# Patient Record
Sex: FEMALE | Race: WHITE | NOT HISPANIC OR LATINO | Employment: FULL TIME | URBAN - METROPOLITAN AREA
[De-identification: names, ages, dates, MRNs, and addresses within clinical notes are randomized per-mention and may not be internally consistent; named-entity substitution may affect disease eponyms.]

---

## 2021-12-13 ENCOUNTER — TELEPHONE (OUTPATIENT)
Dept: CARDIAC SURGERY | Facility: CLINIC | Age: 51
End: 2021-12-13

## 2021-12-13 ENCOUNTER — HOSPITAL ENCOUNTER (INPATIENT)
Facility: HOSPITAL | Age: 51
LOS: 8 days | Discharge: NURSING, REHAB OR LONG-TERM CARE | End: 2021-12-21
Attending: THORACIC SURGERY (CARDIOTHORACIC VASCULAR SURGERY) | Admitting: THORACIC SURGERY (CARDIOTHORACIC VASCULAR SURGERY)

## 2021-12-13 DIAGNOSIS — I25.10 CAD, MULTIPLE VESSEL: Primary | ICD-10-CM

## 2021-12-13 DIAGNOSIS — Z95.1 S/P CABG X 4: ICD-10-CM

## 2021-12-13 LAB
APTT PPP: 30.8 SECONDS (ref 61–76.5)
BASOPHILS # BLD AUTO: 0.1 10*3/MM3 (ref 0–0.2)
BASOPHILS NFR BLD AUTO: 1.3 % (ref 0–1.5)
DEPRECATED RDW RBC AUTO: 38.9 FL (ref 37–54)
EOSINOPHIL # BLD AUTO: 0.3 10*3/MM3 (ref 0–0.4)
EOSINOPHIL NFR BLD AUTO: 2.9 % (ref 0.3–6.2)
ERYTHROCYTE [DISTWIDTH] IN BLOOD BY AUTOMATED COUNT: 13.2 % (ref 12.3–15.4)
GLUCOSE BLDC GLUCOMTR-MCNC: 157 MG/DL (ref 70–105)
HCT VFR BLD AUTO: 35.6 % (ref 34–46.6)
HGB BLD-MCNC: 12 G/DL (ref 12–15.9)
INR PPP: 0.97 (ref 0.93–1.1)
LYMPHOCYTES # BLD AUTO: 2.1 10*3/MM3 (ref 0.7–3.1)
LYMPHOCYTES NFR BLD AUTO: 20.9 % (ref 19.6–45.3)
MCH RBC QN AUTO: 28.2 PG (ref 26.6–33)
MCHC RBC AUTO-ENTMCNC: 33.8 G/DL (ref 31.5–35.7)
MCV RBC AUTO: 83.6 FL (ref 79–97)
MONOCYTES # BLD AUTO: 0.7 10*3/MM3 (ref 0.1–0.9)
MONOCYTES NFR BLD AUTO: 6.7 % (ref 5–12)
NEUTROPHILS NFR BLD AUTO: 6.8 10*3/MM3 (ref 1.7–7)
NEUTROPHILS NFR BLD AUTO: 68.2 % (ref 42.7–76)
NRBC BLD AUTO-RTO: 0.1 /100 WBC (ref 0–0.2)
PLATELET # BLD AUTO: 341 10*3/MM3 (ref 140–450)
PMV BLD AUTO: 6.8 FL (ref 6–12)
PROTHROMBIN TIME: 10.8 SECONDS (ref 9.6–11.7)
RBC # BLD AUTO: 4.26 10*6/MM3 (ref 3.77–5.28)
WBC NRBC COR # BLD: 9.9 10*3/MM3 (ref 3.4–10.8)

## 2021-12-13 PROCEDURE — 25010000002 HEPARIN (PORCINE) 25000-0.45 UT/250ML-% SOLUTION

## 2021-12-13 PROCEDURE — 85610 PROTHROMBIN TIME: CPT

## 2021-12-13 PROCEDURE — 93005 ELECTROCARDIOGRAM TRACING: CPT

## 2021-12-13 PROCEDURE — 85025 COMPLETE CBC W/AUTO DIFF WBC: CPT

## 2021-12-13 PROCEDURE — 93010 ELECTROCARDIOGRAM REPORT: CPT | Performed by: INTERNAL MEDICINE

## 2021-12-13 PROCEDURE — 85730 THROMBOPLASTIN TIME PARTIAL: CPT

## 2021-12-13 PROCEDURE — 82962 GLUCOSE BLOOD TEST: CPT

## 2021-12-13 RX ORDER — DEXTROSE MONOHYDRATE 25 G/50ML
25 INJECTION, SOLUTION INTRAVENOUS
Status: DISCONTINUED | OUTPATIENT
Start: 2021-12-13 | End: 2021-12-15

## 2021-12-13 RX ORDER — LISINOPRIL 10 MG/1
15 TABLET ORAL EVERY 12 HOURS
Status: ON HOLD | COMMUNITY
End: 2021-12-14

## 2021-12-13 RX ORDER — ASPIRIN 81 MG/1
81 TABLET, CHEWABLE ORAL DAILY
Status: DISCONTINUED | OUTPATIENT
Start: 2021-12-14 | End: 2021-12-14

## 2021-12-13 RX ORDER — NICOTINE POLACRILEX 4 MG
15 LOZENGE BUCCAL
Status: DISCONTINUED | OUTPATIENT
Start: 2021-12-13 | End: 2021-12-15

## 2021-12-13 RX ORDER — HEPARIN SODIUM 10000 [USP'U]/100ML
10.7 INJECTION, SOLUTION INTRAVENOUS
Status: DISCONTINUED | OUTPATIENT
Start: 2021-12-13 | End: 2021-12-14

## 2021-12-13 RX ORDER — INSULIN LISPRO 100 [IU]/ML
0-7 INJECTION, SOLUTION INTRAVENOUS; SUBCUTANEOUS AS NEEDED
Status: DISCONTINUED | OUTPATIENT
Start: 2021-12-13 | End: 2021-12-15

## 2021-12-13 RX ORDER — INSULIN LISPRO 100 [IU]/ML
0-7 INJECTION, SOLUTION INTRAVENOUS; SUBCUTANEOUS
Status: DISCONTINUED | OUTPATIENT
Start: 2021-12-14 | End: 2021-12-15

## 2021-12-13 RX ORDER — OLANZAPINE 10 MG/2ML
1 INJECTION, POWDER, LYOPHILIZED, FOR SOLUTION INTRAMUSCULAR
Status: DISCONTINUED | OUTPATIENT
Start: 2021-12-13 | End: 2021-12-15

## 2021-12-13 RX ADMIN — METOPROLOL TARTRATE 12.5 MG: 25 TABLET, FILM COATED ORAL at 23:10

## 2021-12-13 RX ADMIN — HEPARIN SODIUM 13.7 UNITS/KG/HR: 10000 INJECTION, SOLUTION INTRAVENOUS at 20:43

## 2021-12-13 NOTE — TELEPHONE ENCOUNTER
Spoke to Tihen with information regarding transfer from [facility]. To be admitted under Pagni for CAD. She is stating that it might be tomorrow before she can be transferred due to bed shortage.

## 2021-12-14 ENCOUNTER — ANESTHESIA EVENT (OUTPATIENT)
Dept: PERIOP | Facility: HOSPITAL | Age: 51
End: 2021-12-14

## 2021-12-14 ENCOUNTER — APPOINTMENT (OUTPATIENT)
Dept: CARDIOLOGY | Facility: HOSPITAL | Age: 51
End: 2021-12-14

## 2021-12-14 ENCOUNTER — ANESTHESIA (OUTPATIENT)
Dept: PERIOP | Facility: HOSPITAL | Age: 51
End: 2021-12-14

## 2021-12-14 ENCOUNTER — APPOINTMENT (OUTPATIENT)
Dept: RESPIRATORY THERAPY | Facility: HOSPITAL | Age: 51
End: 2021-12-14

## 2021-12-14 ENCOUNTER — APPOINTMENT (OUTPATIENT)
Dept: GENERAL RADIOLOGY | Facility: HOSPITAL | Age: 51
End: 2021-12-14

## 2021-12-14 LAB
ABO GROUP BLD: NORMAL
ACT BLD: 124 SECONDS (ref 89–137)
ACT BLD: 148 SECONDS (ref 89–137)
ACT BLD: 380 SECONDS (ref 89–137)
ACT BLD: 451 SECONDS (ref 89–137)
ACT BLD: 523 SECONDS (ref 89–137)
ALBUMIN SERPL-MCNC: 3.4 G/DL (ref 3.5–5.2)
ALBUMIN SERPL-MCNC: 4 G/DL (ref 3.5–5.2)
ALBUMIN SERPL-MCNC: 4.3 G/DL (ref 3.5–5.2)
ALP SERPL-CCNC: 93 U/L (ref 39–117)
ALT SERPL W P-5'-P-CCNC: 43 U/L (ref 1–33)
ANION GAP SERPL CALCULATED.3IONS-SCNC: 12 MMOL/L (ref 5–15)
ANION GAP SERPL CALCULATED.3IONS-SCNC: 13 MMOL/L (ref 5–15)
ANION GAP SERPL CALCULATED.3IONS-SCNC: 13 MMOL/L (ref 5–15)
APTT PPP: 26.8 SECONDS (ref 24–31)
APTT PPP: 43.2 SECONDS (ref 61–76.5)
APTT PPP: 46.5 SECONDS (ref 61–76.5)
ARTERIAL PATENCY WRIST A: ABNORMAL
ARTERIAL PATENCY WRIST A: POSITIVE
AST SERPL-CCNC: 39 U/L (ref 1–32)
ATMOSPHERIC PRESS: ABNORMAL MM[HG]
B-HCG UR QL: NEGATIVE
BASE DEFICIT: ABNORMAL
BASE EXCESS BLDA CALC-SCNC: -1.9 MMOL/L (ref 0–3)
BASE EXCESS BLDA CALC-SCNC: -2.3 MMOL/L (ref 0–3)
BASE EXCESS BLDA CALC-SCNC: -2.4 MMOL/L (ref 0–3)
BASE EXCESS BLDA CALC-SCNC: 1.9 MMOL/L (ref 0–3)
BASE EXCESS BLDA CALC-SCNC: 2 MMOL/L (ref 0–3)
BASOPHILS # BLD AUTO: 0.1 10*3/MM3 (ref 0–0.2)
BASOPHILS # BLD AUTO: 0.2 10*3/MM3 (ref 0–0.2)
BASOPHILS NFR BLD AUTO: 0.6 % (ref 0–1.5)
BASOPHILS NFR BLD AUTO: 1.2 % (ref 0–1.5)
BDY SITE: ABNORMAL
BH BB BLOOD EXPIRATION DATE: NORMAL
BH BB BLOOD EXPIRATION DATE: NORMAL
BH BB BLOOD TYPE BARCODE: 5100
BH BB BLOOD TYPE BARCODE: 5100
BH BB DISPENSE STATUS: NORMAL
BH BB DISPENSE STATUS: NORMAL
BH BB PRODUCT CODE: NORMAL
BH BB PRODUCT CODE: NORMAL
BH BB UNIT NUMBER: NORMAL
BH BB UNIT NUMBER: NORMAL
BH CV ECHO MEAS - ACS: 1.6 CM
BH CV ECHO MEAS - AO MAX PG (FULL): 15 MMHG
BH CV ECHO MEAS - AO MAX PG: 19.7 MMHG
BH CV ECHO MEAS - AO MEAN PG (FULL): 8.3 MMHG
BH CV ECHO MEAS - AO MEAN PG: 10.5 MMHG
BH CV ECHO MEAS - AO ROOT AREA (BSA CORRECTED): 1.5
BH CV ECHO MEAS - AO ROOT AREA: 6.6 CM^2
BH CV ECHO MEAS - AO ROOT DIAM: 2.9 CM
BH CV ECHO MEAS - AO V2 MAX: 222 CM/SEC
BH CV ECHO MEAS - AO V2 MEAN: 150.1 CM/SEC
BH CV ECHO MEAS - AO V2 VTI: 44.6 CM
BH CV ECHO MEAS - AORTIC HR: 68.8 BPM
BH CV ECHO MEAS - AORTIC R-R: 0.87 SEC
BH CV ECHO MEAS - ASC AORTA: 3.1 CM
BH CV ECHO MEAS - AVA(I,A): 1.7 CM^2
BH CV ECHO MEAS - AVA(I,D): 1.7 CM^2
BH CV ECHO MEAS - AVA(V,A): 1.7 CM^2
BH CV ECHO MEAS - AVA(V,D): 1.7 CM^2
BH CV ECHO MEAS - BSA(HAYCOCK): 2.1 M^2
BH CV ECHO MEAS - BSA: 2 M^2
BH CV ECHO MEAS - BZI_BMI: 32.9 KILOGRAMS/M^2
BH CV ECHO MEAS - BZI_METRIC_HEIGHT: 165.1 CM
BH CV ECHO MEAS - BZI_METRIC_WEIGHT: 89.8 KG
BH CV ECHO MEAS - CI(AO): 10.3 L/MIN/M^2
BH CV ECHO MEAS - CI(LVOT): 2.7 L/MIN/M^2
BH CV ECHO MEAS - CO(AO): 20.4 L/MIN
BH CV ECHO MEAS - CO(LVOT): 5.3 L/MIN
BH CV ECHO MEAS - EDV(CUBED): 83.9 ML
BH CV ECHO MEAS - EDV(MOD-SP4): 66.8 ML
BH CV ECHO MEAS - EDV(TEICH): 86.6 ML
BH CV ECHO MEAS - EF(CUBED): 71.9 %
BH CV ECHO MEAS - EF(MOD-BP): 59 %
BH CV ECHO MEAS - EF(MOD-SP4): 58.9 %
BH CV ECHO MEAS - EF(TEICH): 63.8 %
BH CV ECHO MEAS - ESV(CUBED): 23.6 ML
BH CV ECHO MEAS - ESV(MOD-SP4): 27.5 ML
BH CV ECHO MEAS - ESV(TEICH): 31.4 ML
BH CV ECHO MEAS - FS: 34.5 %
BH CV ECHO MEAS - IVS/LVPW: 1.1
BH CV ECHO MEAS - IVSD: 1.7 CM
BH CV ECHO MEAS - LA DIMENSION(2D): 3.7 CM
BH CV ECHO MEAS - LV DIASTOLIC VOL/BSA (35-75): 33.9 ML/M^2
BH CV ECHO MEAS - LV MASS(C)D: 287 GRAMS
BH CV ECHO MEAS - LV MASS(C)DI: 145.7 GRAMS/M^2
BH CV ECHO MEAS - LV MAX PG: 4.7 MMHG
BH CV ECHO MEAS - LV MEAN PG: 2.2 MMHG
BH CV ECHO MEAS - LV SYSTOLIC VOL/BSA (12-30): 14 ML/M^2
BH CV ECHO MEAS - LV V1 MAX: 108.4 CM/SEC
BH CV ECHO MEAS - LV V1 MEAN: 66.3 CM/SEC
BH CV ECHO MEAS - LV V1 VTI: 21.9 CM
BH CV ECHO MEAS - LVIDD: 4.4 CM
BH CV ECHO MEAS - LVIDS: 2.9 CM
BH CV ECHO MEAS - LVOT AREA: 3.5 CM^2
BH CV ECHO MEAS - LVOT DIAM: 2.1 CM
BH CV ECHO MEAS - LVPWD: 1.5 CM
BH CV ECHO MEAS - MV A MAX VEL: 69.7 CM/SEC
BH CV ECHO MEAS - MV DEC SLOPE: 358.7 CM/SEC^2
BH CV ECHO MEAS - MV DEC TIME: 0.22 SEC
BH CV ECHO MEAS - MV E MAX VEL: 80.6 CM/SEC
BH CV ECHO MEAS - MV E/A: 1.2
BH CV ECHO MEAS - MV MAX PG: 4.6 MMHG
BH CV ECHO MEAS - MV MEAN PG: 1.9 MMHG
BH CV ECHO MEAS - MV V2 MAX: 107.6 CM/SEC
BH CV ECHO MEAS - MV V2 MEAN: 64.8 CM/SEC
BH CV ECHO MEAS - MV V2 VTI: 23.9 CM
BH CV ECHO MEAS - MVA(VTI): 3.2 CM^2
BH CV ECHO MEAS - PA ACC TIME: 0.08 SEC
BH CV ECHO MEAS - PA MAX PG (FULL): 1.2 MMHG
BH CV ECHO MEAS - PA MAX PG: 4.8 MMHG
BH CV ECHO MEAS - PA MEAN PG (FULL): 1 MMHG
BH CV ECHO MEAS - PA MEAN PG: 2.9 MMHG
BH CV ECHO MEAS - PA PR(ACCEL): 44.7 MMHG
BH CV ECHO MEAS - PA V2 MAX: 109.6 CM/SEC
BH CV ECHO MEAS - PA V2 MEAN: 80.3 CM/SEC
BH CV ECHO MEAS - PA V2 VTI: 25.7 CM
BH CV ECHO MEAS - PVA(I,A): 2.3 CM^2
BH CV ECHO MEAS - PVA(I,D): 2.3 CM^2
BH CV ECHO MEAS - PVA(V,A): 2.2 CM^2
BH CV ECHO MEAS - PVA(V,D): 2.2 CM^2
BH CV ECHO MEAS - QP/QS: 0.78
BH CV ECHO MEAS - RAP SYSTOLE: 3 MMHG
BH CV ECHO MEAS - RV MAX PG: 3.6 MMHG
BH CV ECHO MEAS - RV MEAN PG: 1.8 MMHG
BH CV ECHO MEAS - RV V1 MAX: 95.4 CM/SEC
BH CV ECHO MEAS - RV V1 MEAN: 62.4 CM/SEC
BH CV ECHO MEAS - RV V1 VTI: 23.5 CM
BH CV ECHO MEAS - RVDD: 2.4 CM
BH CV ECHO MEAS - RVOT AREA: 2.5 CM^2
BH CV ECHO MEAS - RVOT DIAM: 1.8 CM
BH CV ECHO MEAS - RVSP: 15.1 MMHG
BH CV ECHO MEAS - SI(AO): 150.1 ML/M^2
BH CV ECHO MEAS - SI(CUBED): 30.6 ML/M^2
BH CV ECHO MEAS - SI(LVOT): 39 ML/M^2
BH CV ECHO MEAS - SI(MOD-SP4): 20 ML/M^2
BH CV ECHO MEAS - SI(TEICH): 28.1 ML/M^2
BH CV ECHO MEAS - SV(AO): 295.7 ML
BH CV ECHO MEAS - SV(CUBED): 60.3 ML
BH CV ECHO MEAS - SV(LVOT): 76.7 ML
BH CV ECHO MEAS - SV(MOD-SP4): 39.3 ML
BH CV ECHO MEAS - SV(RVOT): 59.5 ML
BH CV ECHO MEAS - SV(TEICH): 55.3 ML
BH CV ECHO MEAS - TR MAX VEL: 173.7 CM/SEC
BH CV XLRA MEAS - DIST GSV THIGH DIST LEFT: 0.61 CM
BH CV XLRA MEAS - DIST GSV THIGH DIST RIGHT: 0.49 CM
BH CV XLRA MEAS - GSV ANKLE DIST LEFT: 0.38 CM
BH CV XLRA MEAS - GSV ANKLE DIST RIGHT: 0.32 CM
BH CV XLRA MEAS - MID GSV CALF LEFT: 0.36 CM
BH CV XLRA MEAS - MID GSV CALF RIGHT: 0.33 CM
BH CV XLRA MEAS - MID GSV THIGH  LEFT: 0.49 CM
BH CV XLRA MEAS - MID GSV THIGH  RIGHT: 0.49 CM
BH CV XLRA MEAS - PROX GSV CALF DIST LEFT: 0.32 CM
BH CV XLRA MEAS - PROX GSV CALF DIST RIGHT: 0.43 CM
BH CV XLRA MEAS - PROX GSV THIGH  LEFT: 0.51 CM
BH CV XLRA MEAS - PROX GSV THIGH  RIGHT: 0.56 CM
BH CV XLRA MEAS LEFT CCA RATIO VEL: 119 CM/SEC
BH CV XLRA MEAS LEFT DIST CCA EDV: -28.6 CM/SEC
BH CV XLRA MEAS LEFT DIST CCA PSV: -110 CM/SEC
BH CV XLRA MEAS LEFT DIST ICA PSV: -27.4 CM/SEC
BH CV XLRA MEAS LEFT ICA RATIO VEL: -175 CM/SEC
BH CV XLRA MEAS LEFT ICA/CCA RATIO: -1.5
BH CV XLRA MEAS LEFT MID ICA EDV: -49.5 CM/SEC
BH CV XLRA MEAS LEFT MID ICA PSV: -175 CM/SEC
BH CV XLRA MEAS LEFT PROX CCA EDV: 24.8 CM/SEC
BH CV XLRA MEAS LEFT PROX CCA PSV: 119 CM/SEC
BH CV XLRA MEAS LEFT PROX ECA PSV: -174 CM/SEC
BH CV XLRA MEAS LEFT PROX ICA EDV: 47.5 CM/SEC
BH CV XLRA MEAS LEFT PROX ICA PSV: 159 CM/SEC
BH CV XLRA MEAS LEFT PROX SCLA PSV: 202 CM/SEC
BH CV XLRA MEAS LEFT VERTEBRAL A EDV: -19.8 CM/SEC
BH CV XLRA MEAS LEFT VERTEBRAL A PSV: -62 CM/SEC
BH CV XLRA MEAS RIGHT CCA RATIO VEL: 124 CM/SEC
BH CV XLRA MEAS RIGHT DIST CCA EDV: -20.5 CM/SEC
BH CV XLRA MEAS RIGHT DIST CCA PSV: -98.8 CM/SEC
BH CV XLRA MEAS RIGHT DIST ICA EDV: -22.5 CM/SEC
BH CV XLRA MEAS RIGHT DIST ICA PSV: -80.1 CM/SEC
BH CV XLRA MEAS RIGHT ICA RATIO VEL: -154 CM/SEC
BH CV XLRA MEAS RIGHT ICA/CCA RATIO: -1.2
BH CV XLRA MEAS RIGHT PROX CCA EDV: 29.2 CM/SEC
BH CV XLRA MEAS RIGHT PROX CCA PSV: 124 CM/SEC
BH CV XLRA MEAS RIGHT PROX ECA PSV: -209 CM/SEC
BH CV XLRA MEAS RIGHT PROX ICA EDV: -43.3 CM/SEC
BH CV XLRA MEAS RIGHT PROX ICA PSV: -154 CM/SEC
BH CV XLRA MEAS RIGHT PROX SCLA PSV: 200 CM/SEC
BH CV XLRA MEAS RIGHT VERTEBRAL A EDV: -17.4 CM/SEC
BH CV XLRA MEAS RIGHT VERTEBRAL A PSV: -68.3 CM/SEC
BILIRUB CONJ SERPL-MCNC: 0.2 MG/DL (ref 0–0.3)
BILIRUB INDIRECT SERPL-MCNC: 0.2 MG/DL
BILIRUB SERPL-MCNC: 0.4 MG/DL (ref 0–1.2)
BILIRUB UR QL STRIP: NEGATIVE
BLD GP AB SCN SERPL QL: NEGATIVE
BUN SERPL-MCNC: 17 MG/DL (ref 6–20)
BUN SERPL-MCNC: 18 MG/DL (ref 6–20)
BUN SERPL-MCNC: 20 MG/DL (ref 6–20)
BUN/CREAT SERPL: 24.3 (ref 7–25)
BUN/CREAT SERPL: 24.7 (ref 7–25)
BUN/CREAT SERPL: 34.5 (ref 7–25)
CA-I BLDA-SCNC: 1.2 MMOL/L (ref 1.15–1.33)
CA-I BLDA-SCNC: 1.34 MMOL/L (ref 1.12–1.32)
CA-I BLDA-SCNC: 1.35 MMOL/L (ref 1.15–1.33)
CA-I BLDA-SCNC: 1.35 MMOL/L (ref 1.15–1.33)
CA-I SERPL ISE-MCNC: 1.41 MMOL/L (ref 1.2–1.3)
CALCIUM SPEC-SCNC: 9 MG/DL (ref 8.6–10.5)
CALCIUM SPEC-SCNC: 9.7 MG/DL (ref 8.6–10.5)
CALCIUM SPEC-SCNC: 9.7 MG/DL (ref 8.6–10.5)
CHLORIDE SERPL-SCNC: 102 MMOL/L (ref 98–107)
CHLORIDE SERPL-SCNC: 107 MMOL/L (ref 98–107)
CHLORIDE SERPL-SCNC: 108 MMOL/L (ref 98–107)
CHOLEST SERPL-MCNC: 176 MG/DL (ref 0–200)
CLARITY UR: CLEAR
CLOSE TME COLL+ADP + EPINEP PNL BLD: 96 % (ref 86–100)
CO2 BLDA-SCNC: 23.8 MMOL/L (ref 22–29)
CO2 BLDA-SCNC: 23.8 MMOL/L (ref 22–29)
CO2 BLDA-SCNC: 24.7 MMOL/L (ref 22–29)
CO2 BLDA-SCNC: 27.8 MMOL/L (ref 22–29)
CO2 BLDA-SCNC: 29 MMOL/L (ref 23–27)
CO2 SERPL-SCNC: 22 MMOL/L (ref 22–29)
CO2 SERPL-SCNC: 22 MMOL/L (ref 22–29)
CO2 SERPL-SCNC: 24 MMOL/L (ref 22–29)
COLOR UR: YELLOW
CREAT SERPL-MCNC: 0.58 MG/DL (ref 0.57–1)
CREAT SERPL-MCNC: 0.7 MG/DL (ref 0.57–1)
CREAT SERPL-MCNC: 0.73 MG/DL (ref 0.57–1)
CROSSMATCH INTERPRETATION: NORMAL
CROSSMATCH INTERPRETATION: NORMAL
DEPRECATED RDW RBC AUTO: 38.5 FL (ref 37–54)
DEPRECATED RDW RBC AUTO: 40.3 FL (ref 37–54)
EOSINOPHIL # BLD AUTO: 0.1 10*3/MM3 (ref 0–0.4)
EOSINOPHIL # BLD AUTO: 0.4 10*3/MM3 (ref 0–0.4)
EOSINOPHIL NFR BLD AUTO: 1.2 % (ref 0.3–6.2)
EOSINOPHIL NFR BLD AUTO: 3.3 % (ref 0.3–6.2)
ERYTHROCYTE [DISTWIDTH] IN BLOOD BY AUTOMATED COUNT: 13 % (ref 12.3–15.4)
ERYTHROCYTE [DISTWIDTH] IN BLOOD BY AUTOMATED COUNT: 13.4 % (ref 12.3–15.4)
FIBRINOGEN PPP-MCNC: 369 MG/DL (ref 210–450)
GFR SERPL CREATININE-BSD FRML MDRD: 110 ML/MIN/1.73
GFR SERPL CREATININE-BSD FRML MDRD: 85 ML/MIN/1.73
GFR SERPL CREATININE-BSD FRML MDRD: 89 ML/MIN/1.73
GLUCOSE BLDC GLUCOMTR-MCNC: 126 MG/DL (ref 70–105)
GLUCOSE BLDC GLUCOMTR-MCNC: 130 MG/DL (ref 70–105)
GLUCOSE BLDC GLUCOMTR-MCNC: 137 MG/DL (ref 70–105)
GLUCOSE BLDC GLUCOMTR-MCNC: 137 MG/DL (ref 70–105)
GLUCOSE BLDC GLUCOMTR-MCNC: 141 MG/DL (ref 74–100)
GLUCOSE BLDC GLUCOMTR-MCNC: 143 MG/DL (ref 70–105)
GLUCOSE BLDC GLUCOMTR-MCNC: 146 MG/DL (ref 70–105)
GLUCOSE BLDC GLUCOMTR-MCNC: 147 MG/DL (ref 70–105)
GLUCOSE BLDC GLUCOMTR-MCNC: 149 MG/DL (ref 74–100)
GLUCOSE BLDC GLUCOMTR-MCNC: 149 MG/DL (ref 74–100)
GLUCOSE BLDC GLUCOMTR-MCNC: 160 MG/DL (ref 70–105)
GLUCOSE SERPL-MCNC: 129 MG/DL (ref 65–99)
GLUCOSE SERPL-MCNC: 132 MG/DL (ref 65–99)
GLUCOSE SERPL-MCNC: 144 MG/DL (ref 65–99)
GLUCOSE UR STRIP-MCNC: NEGATIVE MG/DL
HBA1C MFR BLD: 7.9 % (ref 3.5–5.6)
HCO3 BLDA-SCNC: 22.6 MMOL/L (ref 21–28)
HCO3 BLDA-SCNC: 22.6 MMOL/L (ref 21–28)
HCO3 BLDA-SCNC: 23.4 MMOL/L (ref 21–28)
HCO3 BLDA-SCNC: 26.5 MMOL/L (ref 21–28)
HCO3 BLDA-SCNC: 27.4 MMOL/L (ref 22–26)
HCT VFR BLD AUTO: 28.4 % (ref 34–46.6)
HCT VFR BLD AUTO: 37.3 % (ref 34–46.6)
HCT VFR BLDA CALC: 30 % (ref 38–51)
HCT VFR BLDA CALC: 30 % (ref 38–51)
HCT VFR BLDA CALC: 32 % (ref 38–51)
HCT VFR BLDA CALC: 33 % (ref 38–51)
HDLC SERPL-MCNC: 25 MG/DL (ref 40–60)
HEMODILUTION: NO
HEMODILUTION: YES
HGB BLD-MCNC: 12.4 G/DL (ref 12–15.9)
HGB BLD-MCNC: 9.5 G/DL (ref 12–15.9)
HGB BLDA-MCNC: 10.1 G/DL (ref 12–17)
HGB BLDA-MCNC: 10.2 G/DL (ref 12–17)
HGB BLDA-MCNC: 10.7 G/DL (ref 12–17)
HGB BLDA-MCNC: 11.2 G/DL (ref 12–17)
HGB UR QL STRIP.AUTO: NEGATIVE
INHALED O2 CONCENTRATION: 100 %
INHALED O2 CONCENTRATION: 21 %
INHALED O2 CONCENTRATION: 40 %
INHALED O2 CONCENTRATION: 70 %
INR PPP: 1 (ref 0.93–1.1)
INR PPP: 1.1 (ref 0.93–1.1)
KETONES UR QL STRIP: NEGATIVE
LDLC SERPL CALC-MCNC: 117 MG/DL (ref 0–100)
LDLC/HDLC SERPL: 4.54 {RATIO}
LEFT ARM BP: NORMAL MMHG
LEUKOCYTE ESTERASE UR QL STRIP.AUTO: NEGATIVE
LYMPHOCYTES # BLD AUTO: 1.2 10*3/MM3 (ref 0.7–3.1)
LYMPHOCYTES # BLD AUTO: 3.1 10*3/MM3 (ref 0.7–3.1)
LYMPHOCYTES NFR BLD AUTO: 10.8 % (ref 19.6–45.3)
LYMPHOCYTES NFR BLD AUTO: 25.6 % (ref 19.6–45.3)
MAGNESIUM SERPL-MCNC: 2.2 MG/DL (ref 1.6–2.6)
MAGNESIUM SERPL-MCNC: 2.4 MG/DL (ref 1.6–2.6)
MAXIMAL PREDICTED HEART RATE: 171 BPM
MAXIMAL PREDICTED HEART RATE: 171 BPM
MCH RBC QN AUTO: 28.3 PG (ref 26.6–33)
MCH RBC QN AUTO: 28.7 PG (ref 26.6–33)
MCHC RBC AUTO-ENTMCNC: 33.3 G/DL (ref 31.5–35.7)
MCHC RBC AUTO-ENTMCNC: 33.6 G/DL (ref 31.5–35.7)
MCV RBC AUTO: 84.9 FL (ref 79–97)
MCV RBC AUTO: 85.4 FL (ref 79–97)
MODALITY: ABNORMAL
MONOCYTES # BLD AUTO: 0.4 10*3/MM3 (ref 0.1–0.9)
MONOCYTES # BLD AUTO: 0.8 10*3/MM3 (ref 0.1–0.9)
MONOCYTES NFR BLD AUTO: 3.4 % (ref 5–12)
MONOCYTES NFR BLD AUTO: 6.6 % (ref 5–12)
MRSA DNA SPEC QL NAA+PROBE: NORMAL
NEUTROPHILS NFR BLD AUTO: 63.3 % (ref 42.7–76)
NEUTROPHILS NFR BLD AUTO: 7.7 10*3/MM3 (ref 1.7–7)
NEUTROPHILS NFR BLD AUTO: 84 % (ref 42.7–76)
NEUTROPHILS NFR BLD AUTO: 9.4 10*3/MM3 (ref 1.7–7)
NITRITE UR QL STRIP: NEGATIVE
NRBC BLD AUTO-RTO: 0 /100 WBC (ref 0–0.2)
NRBC BLD AUTO-RTO: 0.1 /100 WBC (ref 0–0.2)
PCO2 BLDA: 38.6 MM HG (ref 35–48)
PCO2 BLDA: 39.1 MM HG (ref 35–48)
PCO2 BLDA: 40.5 MM HG (ref 35–48)
PCO2 BLDA: 41.3 MM HG (ref 35–48)
PCO2 BLDA: 46.1 MM HG (ref 35–45)
PEEP RESPIRATORY: 8 CM[H2O]
PH BLDA: 7.36 PH UNITS (ref 7.35–7.45)
PH BLDA: 7.37 PH UNITS (ref 7.35–7.45)
PH BLDA: 7.38 PH UNITS (ref 7.35–7.45)
PH BLDA: 7.38 PH UNITS (ref 7.35–7.45)
PH BLDA: 7.42 PH UNITS (ref 7.35–7.45)
PH UR STRIP.AUTO: 5.5 [PH] (ref 5–8)
PHOSPHATE SERPL-MCNC: 4.4 MG/DL (ref 2.5–4.5)
PHOSPHATE SERPL-MCNC: 5.2 MG/DL (ref 2.5–4.5)
PLATELET # BLD AUTO: 223 10*3/MM3 (ref 140–450)
PLATELET # BLD AUTO: 353 10*3/MM3 (ref 140–450)
PMV BLD AUTO: 6.9 FL (ref 6–12)
PMV BLD AUTO: 6.9 FL (ref 6–12)
PO2 BLDA: 195.1 MM HG (ref 83–108)
PO2 BLDA: 214.6 MM HG (ref 83–108)
PO2 BLDA: 375 MM HG (ref 80–105)
PO2 BLDA: 73 MM HG (ref 83–108)
PO2 BLDA: 86 MM HG (ref 83–108)
POTASSIUM BLDA-SCNC: 4 MMOL/L (ref 3.5–4.9)
POTASSIUM BLDA-SCNC: 4.1 MMOL/L (ref 3.5–4.5)
POTASSIUM BLDA-SCNC: 4.3 MMOL/L (ref 3.5–4.5)
POTASSIUM BLDA-SCNC: 4.3 MMOL/L (ref 3.5–4.5)
POTASSIUM SERPL-SCNC: 4.1 MMOL/L (ref 3.5–5.2)
POTASSIUM SERPL-SCNC: 4.2 MMOL/L (ref 3.5–5.2)
POTASSIUM SERPL-SCNC: 4.2 MMOL/L (ref 3.5–5.2)
PREALB SERPL-MCNC: 17 MG/DL (ref 20–40)
PROT SERPL-MCNC: 6.8 G/DL (ref 6–8.5)
PROT UR QL STRIP: NEGATIVE
PROTHROMBIN TIME: 11.1 SECONDS (ref 9.6–11.7)
PROTHROMBIN TIME: 12.1 SECONDS (ref 9.6–11.7)
RBC # BLD AUTO: 3.32 10*6/MM3 (ref 3.77–5.28)
RBC # BLD AUTO: 4.39 10*6/MM3 (ref 3.77–5.28)
RESPIRATORY RATE: 14
RH BLD: POSITIVE
RIGHT ARM BP: NORMAL MMHG
SAO2 % BLDCOA: 100 % (ref 95–98)
SAO2 % BLDCOA: 94.8 % (ref 94–98)
SAO2 % BLDCOA: 96.3 % (ref 94–98)
SAO2 % BLDCOA: 99.7 % (ref 94–98)
SAO2 % BLDCOA: 99.7 % (ref 94–98)
SARS-COV-2 RNA PNL SPEC NAA+PROBE: NOT DETECTED
SODIUM BLD-SCNC: 140 MMOL/L (ref 138–146)
SODIUM BLD-SCNC: 142 MMOL/L (ref 138–146)
SODIUM SERPL-SCNC: 139 MMOL/L (ref 136–145)
SODIUM SERPL-SCNC: 142 MMOL/L (ref 136–145)
SODIUM SERPL-SCNC: 142 MMOL/L (ref 136–145)
SP GR UR STRIP: 1.01 (ref 1–1.03)
STRESS TARGET HR: 145 BPM
STRESS TARGET HR: 145 BPM
T&S EXPIRATION DATE: NORMAL
TRIGL SERPL-MCNC: 188 MG/DL (ref 0–150)
UNIT  ABO: NORMAL
UNIT  ABO: NORMAL
UNIT  RH: NORMAL
UNIT  RH: NORMAL
UROBILINOGEN UR QL STRIP: NORMAL
VENTILATOR MODE: ABNORMAL
VLDLC SERPL-MCNC: 34 MG/DL (ref 5–40)
VT ON VENT VENT: 600 ML
WBC NRBC COR # BLD: 11.2 10*3/MM3 (ref 3.4–10.8)
WBC NRBC COR # BLD: 12.2 10*3/MM3 (ref 3.4–10.8)

## 2021-12-14 PROCEDURE — 25010000002 CEFAZOLIN PER 500 MG: Performed by: THORACIC SURGERY (CARDIOTHORACIC VASCULAR SURGERY)

## 2021-12-14 PROCEDURE — 83735 ASSAY OF MAGNESIUM: CPT | Performed by: PHYSICIAN ASSISTANT

## 2021-12-14 PROCEDURE — 84134 ASSAY OF PREALBUMIN: CPT | Performed by: THORACIC SURGERY (CARDIOTHORACIC VASCULAR SURGERY)

## 2021-12-14 PROCEDURE — 25010000002 HEPARIN (PORCINE) 25000-0.45 UT/250ML-% SOLUTION

## 2021-12-14 PROCEDURE — 82330 ASSAY OF CALCIUM: CPT | Performed by: PHYSICIAN ASSISTANT

## 2021-12-14 PROCEDURE — 85025 COMPLETE CBC W/AUTO DIFF WBC: CPT | Performed by: PHYSICIAN ASSISTANT

## 2021-12-14 PROCEDURE — 25010000002 MIDAZOLAM PER 1 MG: Performed by: ANESTHESIOLOGY

## 2021-12-14 PROCEDURE — C1889 IMPLANT/INSERT DEVICE, NOC: HCPCS | Performed by: THORACIC SURGERY (CARDIOTHORACIC VASCULAR SURGERY)

## 2021-12-14 PROCEDURE — 94002 VENT MGMT INPAT INIT DAY: CPT

## 2021-12-14 PROCEDURE — 82803 BLOOD GASES ANY COMBINATION: CPT

## 2021-12-14 PROCEDURE — 86901 BLOOD TYPING SEROLOGIC RH(D): CPT | Performed by: THORACIC SURGERY (CARDIOTHORACIC VASCULAR SURGERY)

## 2021-12-14 PROCEDURE — 93005 ELECTROCARDIOGRAM TRACING: CPT

## 2021-12-14 PROCEDURE — C1713 ANCHOR/SCREW BN/BN,TIS/BN: HCPCS | Performed by: THORACIC SURGERY (CARDIOTHORACIC VASCULAR SURGERY)

## 2021-12-14 PROCEDURE — 87641 MR-STAPH DNA AMP PROBE: CPT | Performed by: THORACIC SURGERY (CARDIOTHORACIC VASCULAR SURGERY)

## 2021-12-14 PROCEDURE — 71045 X-RAY EXAM CHEST 1 VIEW: CPT

## 2021-12-14 PROCEDURE — 94060 EVALUATION OF WHEEZING: CPT

## 2021-12-14 PROCEDURE — 93010 ELECTROCARDIOGRAM REPORT: CPT | Performed by: INTERNAL MEDICINE

## 2021-12-14 PROCEDURE — 85018 HEMOGLOBIN: CPT

## 2021-12-14 PROCEDURE — 25010000002 MAGNESIUM SULFATE IN D5W 1G/100ML (PREMIX) 1-5 GM/100ML-% SOLUTION: Performed by: PHYSICIAN ASSISTANT

## 2021-12-14 PROCEDURE — 33508 ENDOSCOPIC VEIN HARVEST: CPT

## 2021-12-14 PROCEDURE — 85347 COAGULATION TIME ACTIVATED: CPT

## 2021-12-14 PROCEDURE — S0260 H&P FOR SURGERY: HCPCS | Performed by: PHYSICIAN ASSISTANT

## 2021-12-14 PROCEDURE — 33519 CABG ARTERY-VEIN THREE: CPT

## 2021-12-14 PROCEDURE — 85014 HEMATOCRIT: CPT

## 2021-12-14 PROCEDURE — 82947 ASSAY GLUCOSE BLOOD QUANT: CPT

## 2021-12-14 PROCEDURE — 85025 COMPLETE CBC W/AUTO DIFF WBC: CPT

## 2021-12-14 PROCEDURE — 84295 ASSAY OF SERUM SODIUM: CPT

## 2021-12-14 PROCEDURE — 80048 BASIC METABOLIC PNL TOTAL CA: CPT

## 2021-12-14 PROCEDURE — 85610 PROTHROMBIN TIME: CPT | Performed by: PHYSICIAN ASSISTANT

## 2021-12-14 PROCEDURE — 25010000002 CEFAZOLIN PER 500 MG: Performed by: PHYSICIAN ASSISTANT

## 2021-12-14 PROCEDURE — 81003 URINALYSIS AUTO W/O SCOPE: CPT | Performed by: THORACIC SURGERY (CARDIOTHORACIC VASCULAR SURGERY)

## 2021-12-14 PROCEDURE — 85384 FIBRINOGEN ACTIVITY: CPT | Performed by: PHYSICIAN ASSISTANT

## 2021-12-14 PROCEDURE — 93318 ECHO TRANSESOPHAGEAL INTRAOP: CPT | Performed by: ANESTHESIOLOGY

## 2021-12-14 PROCEDURE — 93970 EXTREMITY STUDY: CPT

## 2021-12-14 PROCEDURE — 80076 HEPATIC FUNCTION PANEL: CPT

## 2021-12-14 PROCEDURE — 5A1221Z PERFORMANCE OF CARDIAC OUTPUT, CONTINUOUS: ICD-10-PCS | Performed by: THORACIC SURGERY (CARDIOTHORACIC VASCULAR SURGERY)

## 2021-12-14 PROCEDURE — 86901 BLOOD TYPING SEROLOGIC RH(D): CPT

## 2021-12-14 PROCEDURE — P9041 ALBUMIN (HUMAN),5%, 50ML: HCPCS | Performed by: PHYSICIAN ASSISTANT

## 2021-12-14 PROCEDURE — B245ZZ4 ULTRASONOGRAPHY OF LEFT HEART, TRANSESOPHAGEAL: ICD-10-PCS | Performed by: THORACIC SURGERY (CARDIOTHORACIC VASCULAR SURGERY)

## 2021-12-14 PROCEDURE — 84132 ASSAY OF SERUM POTASSIUM: CPT

## 2021-12-14 PROCEDURE — 82330 ASSAY OF CALCIUM: CPT

## 2021-12-14 PROCEDURE — 85730 THROMBOPLASTIN TIME PARTIAL: CPT | Performed by: THORACIC SURGERY (CARDIOTHORACIC VASCULAR SURGERY)

## 2021-12-14 PROCEDURE — 93306 TTE W/DOPPLER COMPLETE: CPT

## 2021-12-14 PROCEDURE — 25010000002 METOCLOPRAMIDE PER 10 MG: Performed by: PHYSICIAN ASSISTANT

## 2021-12-14 PROCEDURE — C1729 CATH, DRAINAGE: HCPCS | Performed by: THORACIC SURGERY (CARDIOTHORACIC VASCULAR SURGERY)

## 2021-12-14 PROCEDURE — 25010000002 ALBUMIN HUMAN 5% PER 50 ML: Performed by: PHYSICIAN ASSISTANT

## 2021-12-14 PROCEDURE — 06BP4ZZ EXCISION OF RIGHT SAPHENOUS VEIN, PERCUTANEOUS ENDOSCOPIC APPROACH: ICD-10-PCS | Performed by: THORACIC SURGERY (CARDIOTHORACIC VASCULAR SURGERY)

## 2021-12-14 PROCEDURE — 86923 COMPATIBILITY TEST ELECTRIC: CPT

## 2021-12-14 PROCEDURE — 25010000002 HEPARIN (PORCINE) PER 1000 UNITS: Performed by: ANESTHESIOLOGY

## 2021-12-14 PROCEDURE — 87635 SARS-COV-2 COVID-19 AMP PRB: CPT

## 2021-12-14 PROCEDURE — 25010000002 PROTAMINE SULFATE PER 10 MG: Performed by: ANESTHESIOLOGY

## 2021-12-14 PROCEDURE — 33533 CABG ARTERIAL SINGLE: CPT

## 2021-12-14 PROCEDURE — 83036 HEMOGLOBIN GLYCOSYLATED A1C: CPT | Performed by: THORACIC SURGERY (CARDIOTHORACIC VASCULAR SURGERY)

## 2021-12-14 PROCEDURE — 94799 UNLISTED PULMONARY SVC/PX: CPT

## 2021-12-14 PROCEDURE — 33508 ENDOSCOPIC VEIN HARVEST: CPT | Performed by: THORACIC SURGERY (CARDIOTHORACIC VASCULAR SURGERY)

## 2021-12-14 PROCEDURE — 94729 DIFFUSING CAPACITY: CPT

## 2021-12-14 PROCEDURE — 93306 TTE W/DOPPLER COMPLETE: CPT | Performed by: INTERNAL MEDICINE

## 2021-12-14 PROCEDURE — 0 CEFAZOLIN PER 500 MG: Performed by: ANESTHESIOLOGY

## 2021-12-14 PROCEDURE — 80069 RENAL FUNCTION PANEL: CPT | Performed by: PHYSICIAN ASSISTANT

## 2021-12-14 PROCEDURE — 80051 ELECTROLYTE PANEL: CPT

## 2021-12-14 PROCEDURE — 021209W BYPASS CORONARY ARTERY, THREE ARTERIES FROM AORTA WITH AUTOLOGOUS VENOUS TISSUE, OPEN APPROACH: ICD-10-PCS | Performed by: THORACIC SURGERY (CARDIOTHORACIC VASCULAR SURGERY)

## 2021-12-14 PROCEDURE — 33519 CABG ARTERY-VEIN THREE: CPT | Performed by: THORACIC SURGERY (CARDIOTHORACIC VASCULAR SURGERY)

## 2021-12-14 PROCEDURE — 94727 GAS DIL/WSHOT DETER LNG VOL: CPT

## 2021-12-14 PROCEDURE — 25010000002 HYDROMORPHONE PER 4 MG: Performed by: ANESTHESIOLOGY

## 2021-12-14 PROCEDURE — A4648 IMPLANTABLE TISSUE MARKER: HCPCS | Performed by: THORACIC SURGERY (CARDIOTHORACIC VASCULAR SURGERY)

## 2021-12-14 PROCEDURE — C1751 CATH, INF, PER/CENT/MIDLINE: HCPCS | Performed by: ANESTHESIOLOGY

## 2021-12-14 PROCEDURE — 25010000002 MAGNESIUM SULFATE PER 500 MG OF MAGNESIUM: Performed by: ANESTHESIOLOGY

## 2021-12-14 PROCEDURE — 86900 BLOOD TYPING SEROLOGIC ABO: CPT

## 2021-12-14 PROCEDURE — 33533 CABG ARTERIAL SINGLE: CPT | Performed by: THORACIC SURGERY (CARDIOTHORACIC VASCULAR SURGERY)

## 2021-12-14 PROCEDURE — 02100Z9 BYPASS CORONARY ARTERY, ONE ARTERY FROM LEFT INTERNAL MAMMARY, OPEN APPROACH: ICD-10-PCS | Performed by: THORACIC SURGERY (CARDIOTHORACIC VASCULAR SURGERY)

## 2021-12-14 PROCEDURE — 82962 GLUCOSE BLOOD TEST: CPT

## 2021-12-14 PROCEDURE — 81025 URINE PREGNANCY TEST: CPT | Performed by: THORACIC SURGERY (CARDIOTHORACIC VASCULAR SURGERY)

## 2021-12-14 PROCEDURE — 25010000002 PHENYLEPHRINE 10 MG/ML SOLUTION: Performed by: ANESTHESIOLOGY

## 2021-12-14 PROCEDURE — 85730 THROMBOPLASTIN TIME PARTIAL: CPT | Performed by: PHYSICIAN ASSISTANT

## 2021-12-14 PROCEDURE — 63710000001 INSULIN REGULAR HUMAN PER 5 UNITS: Performed by: ANESTHESIOLOGY

## 2021-12-14 PROCEDURE — 93005 ELECTROCARDIOGRAM TRACING: CPT | Performed by: PHYSICIAN ASSISTANT

## 2021-12-14 PROCEDURE — 0 MORPHINE SULFATE 4 MG/ML SOLUTION: Performed by: PHYSICIAN ASSISTANT

## 2021-12-14 PROCEDURE — 86850 RBC ANTIBODY SCREEN: CPT | Performed by: THORACIC SURGERY (CARDIOTHORACIC VASCULAR SURGERY)

## 2021-12-14 PROCEDURE — 36600 WITHDRAWAL OF ARTERIAL BLOOD: CPT

## 2021-12-14 PROCEDURE — 85610 PROTHROMBIN TIME: CPT

## 2021-12-14 PROCEDURE — 80061 LIPID PANEL: CPT | Performed by: THORACIC SURGERY (CARDIOTHORACIC VASCULAR SURGERY)

## 2021-12-14 PROCEDURE — 25010000002 HEPARIN (PORCINE) PER 1000 UNITS: Performed by: THORACIC SURGERY (CARDIOTHORACIC VASCULAR SURGERY)

## 2021-12-14 PROCEDURE — 25010000002 HYDRALAZINE PER 20 MG: Performed by: PHYSICIAN ASSISTANT

## 2021-12-14 PROCEDURE — 25010000002 FENTANYL CITRATE (PF) 250 MCG/5ML SOLUTION: Performed by: ANESTHESIOLOGY

## 2021-12-14 PROCEDURE — 85576 BLOOD PLATELET AGGREGATION: CPT | Performed by: THORACIC SURGERY (CARDIOTHORACIC VASCULAR SURGERY)

## 2021-12-14 PROCEDURE — 93880 EXTRACRANIAL BILAT STUDY: CPT

## 2021-12-14 PROCEDURE — 25010000002 PAPAVERINE PER 60 MG: Performed by: THORACIC SURGERY (CARDIOTHORACIC VASCULAR SURGERY)

## 2021-12-14 PROCEDURE — 25010000002 ONDANSETRON PER 1 MG: Performed by: ANESTHESIOLOGY

## 2021-12-14 PROCEDURE — 86900 BLOOD TYPING SEROLOGIC ABO: CPT | Performed by: THORACIC SURGERY (CARDIOTHORACIC VASCULAR SURGERY)

## 2021-12-14 DEVICE — DEV CONTRL TISS STRATAFIX SPIRAL MNCRYL UD 3/0 PLS 30CM: Type: IMPLANTABLE DEVICE | Site: CHEST | Status: FUNCTIONAL

## 2021-12-14 DEVICE — ABSORBABLE HEMOSTAT (OXIDIZED REGENERATED CELLULOSE, U.S.P.)
Type: IMPLANTABLE DEVICE | Site: CHEST | Status: FUNCTIONAL
Brand: SURGICEL

## 2021-12-14 DEVICE — DEV CONTRL TISS STRATAFIXSPIRALMNCRYL PLSPS2 REV3/0 15CM: Type: IMPLANTABLE DEVICE | Site: LEG | Status: FUNCTIONAL

## 2021-12-14 DEVICE — CLIP LIGAT VASC HORIZON TI SM/WD RED 24CT: Type: IMPLANTABLE DEVICE | Site: CHEST | Status: FUNCTIONAL

## 2021-12-14 DEVICE — WAX,BONE,NATURAL
Type: IMPLANTABLE DEVICE | Site: STERNUM | Status: FUNCTIONAL
Brand: MEDLINE INDUSTRIES

## 2021-12-14 DEVICE — SS SUTURE, 3 PER SLEEVE
Type: IMPLANTABLE DEVICE | Site: STERNUM | Status: FUNCTIONAL
Brand: MYO/WIRE II

## 2021-12-14 DEVICE — SS SUTURE, 6 PER SLEEVE
Type: IMPLANTABLE DEVICE | Site: STERNUM | Status: FUNCTIONAL
Brand: MYO/WIRE II

## 2021-12-14 RX ORDER — AMINOCAPROIC ACID 250 MG/ML
INJECTION, SOLUTION INTRAVENOUS AS NEEDED
Status: DISCONTINUED | OUTPATIENT
Start: 2021-12-14 | End: 2021-12-14 | Stop reason: SURG

## 2021-12-14 RX ORDER — ACETAMINOPHEN 650 MG/1
650 SUPPOSITORY RECTAL EVERY 4 HOURS
Status: DISCONTINUED | OUTPATIENT
Start: 2021-12-14 | End: 2021-12-15

## 2021-12-14 RX ORDER — OXYCODONE HYDROCHLORIDE 5 MG/1
10 TABLET ORAL EVERY 4 HOURS PRN
Status: DISCONTINUED | OUTPATIENT
Start: 2021-12-14 | End: 2021-12-14 | Stop reason: SDUPTHER

## 2021-12-14 RX ORDER — CHLORHEXIDINE GLUCONATE 500 MG/1
1 CLOTH TOPICAL EVERY 12 HOURS
Status: DISCONTINUED | OUTPATIENT
Start: 2021-12-14 | End: 2021-12-14

## 2021-12-14 RX ORDER — ACETAMINOPHEN 650 MG/1
650 SUPPOSITORY RECTAL EVERY 4 HOURS PRN
Status: DISCONTINUED | OUTPATIENT
Start: 2021-12-15 | End: 2021-12-15 | Stop reason: SDUPTHER

## 2021-12-14 RX ORDER — DOPAMINE HYDROCHLORIDE 160 MG/100ML
2-20 INJECTION, SOLUTION INTRAVENOUS CONTINUOUS PRN
Status: DISCONTINUED | OUTPATIENT
Start: 2021-12-14 | End: 2021-12-15

## 2021-12-14 RX ORDER — AMOXICILLIN 250 MG
2 CAPSULE ORAL 2 TIMES DAILY
Status: DISCONTINUED | OUTPATIENT
Start: 2021-12-15 | End: 2021-12-21 | Stop reason: HOSPADM

## 2021-12-14 RX ORDER — NALOXONE HCL 0.4 MG/ML
0.4 VIAL (ML) INJECTION
Status: DISCONTINUED | OUTPATIENT
Start: 2021-12-14 | End: 2021-12-16

## 2021-12-14 RX ORDER — ATORVASTATIN CALCIUM 40 MG/1
40 TABLET, FILM COATED ORAL ONCE
Status: DISCONTINUED | OUTPATIENT
Start: 2021-12-15 | End: 2021-12-14 | Stop reason: SDUPTHER

## 2021-12-14 RX ORDER — CYCLOBENZAPRINE HCL 10 MG
10 TABLET ORAL EVERY 8 HOURS PRN
Status: DISCONTINUED | OUTPATIENT
Start: 2021-12-14 | End: 2021-12-21 | Stop reason: HOSPADM

## 2021-12-14 RX ORDER — MIDAZOLAM HYDROCHLORIDE 1 MG/ML
INJECTION INTRAMUSCULAR; INTRAVENOUS AS NEEDED
Status: DISCONTINUED | OUTPATIENT
Start: 2021-12-14 | End: 2021-12-14 | Stop reason: SURG

## 2021-12-14 RX ORDER — DEXMEDETOMIDINE HYDROCHLORIDE 4 UG/ML
.2-1.5 INJECTION, SOLUTION INTRAVENOUS
Status: DISCONTINUED | OUTPATIENT
Start: 2021-12-14 | End: 2021-12-15

## 2021-12-14 RX ORDER — VECURONIUM BROMIDE 1 MG/ML
INJECTION, POWDER, LYOPHILIZED, FOR SOLUTION INTRAVENOUS AS NEEDED
Status: DISCONTINUED | OUTPATIENT
Start: 2021-12-14 | End: 2021-12-14 | Stop reason: SURG

## 2021-12-14 RX ORDER — ONDANSETRON 2 MG/ML
4 INJECTION INTRAMUSCULAR; INTRAVENOUS EVERY 6 HOURS PRN
Status: DISCONTINUED | OUTPATIENT
Start: 2021-12-14 | End: 2021-12-21 | Stop reason: HOSPADM

## 2021-12-14 RX ORDER — CARVEDILOL 25 MG/1
25 TABLET ORAL 2 TIMES DAILY WITH MEALS
COMMUNITY
End: 2021-12-21 | Stop reason: HOSPADM

## 2021-12-14 RX ORDER — MILRINONE LACTATE 0.2 MG/ML
.25-.75 INJECTION, SOLUTION INTRAVENOUS CONTINUOUS PRN
Status: DISCONTINUED | OUTPATIENT
Start: 2021-12-14 | End: 2021-12-15

## 2021-12-14 RX ORDER — MAGNESIUM HYDROXIDE 1200 MG/15ML
LIQUID ORAL AS NEEDED
Status: DISCONTINUED | OUTPATIENT
Start: 2021-12-14 | End: 2021-12-14 | Stop reason: HOSPADM

## 2021-12-14 RX ORDER — MORPHINE SULFATE 2 MG/ML
1 INJECTION, SOLUTION INTRAMUSCULAR; INTRAVENOUS EVERY 4 HOURS PRN
Status: DISCONTINUED | OUTPATIENT
Start: 2021-12-14 | End: 2021-12-17

## 2021-12-14 RX ORDER — ALBUMIN, HUMAN INJ 5% 5 %
SOLUTION INTRAVENOUS
Status: DISPENSED
Start: 2021-12-14 | End: 2021-12-15

## 2021-12-14 RX ORDER — MAGNESIUM SULFATE HEPTAHYDRATE 40 MG/ML
2 INJECTION, SOLUTION INTRAVENOUS AS NEEDED
Status: DISCONTINUED | OUTPATIENT
Start: 2021-12-14 | End: 2021-12-14 | Stop reason: SDUPTHER

## 2021-12-14 RX ORDER — SODIUM CHLORIDE 0.9 % (FLUSH) 0.9 %
30 SYRINGE (ML) INJECTION ONCE AS NEEDED
Status: DISCONTINUED | OUTPATIENT
Start: 2021-12-14 | End: 2021-12-21 | Stop reason: HOSPADM

## 2021-12-14 RX ORDER — MORPHINE SULFATE 4 MG/ML
4 INJECTION, SOLUTION INTRAMUSCULAR; INTRAVENOUS
Status: DISCONTINUED | OUTPATIENT
Start: 2021-12-14 | End: 2021-12-15

## 2021-12-14 RX ORDER — POTASSIUM CHLORIDE 7.45 MG/ML
10 INJECTION INTRAVENOUS
Status: DISCONTINUED | OUTPATIENT
Start: 2021-12-14 | End: 2021-12-17

## 2021-12-14 RX ORDER — KETAMINE HCL IN NACL, ISO-OSM 100MG/10ML
SYRINGE (ML) INJECTION AS NEEDED
Status: DISCONTINUED | OUTPATIENT
Start: 2021-12-14 | End: 2021-12-14 | Stop reason: SURG

## 2021-12-14 RX ORDER — ALBUTEROL SULFATE 90 UG/1
2 AEROSOL, METERED RESPIRATORY (INHALATION) ONCE
Status: COMPLETED | OUTPATIENT
Start: 2021-12-14 | End: 2021-12-14

## 2021-12-14 RX ORDER — LISINOPRIL AND HYDROCHLOROTHIAZIDE 20; 12.5 MG/1; MG/1
2 TABLET ORAL DAILY
COMMUNITY
End: 2021-12-21 | Stop reason: HOSPADM

## 2021-12-14 RX ORDER — HYDROMORPHONE HCL 110MG/55ML
PATIENT CONTROLLED ANALGESIA SYRINGE INTRAVENOUS AS NEEDED
Status: DISCONTINUED | OUTPATIENT
Start: 2021-12-14 | End: 2021-12-14 | Stop reason: SURG

## 2021-12-14 RX ORDER — ACETAMINOPHEN 325 MG/1
650 TABLET ORAL EVERY 4 HOURS PRN
Status: DISCONTINUED | OUTPATIENT
Start: 2021-12-15 | End: 2021-12-15 | Stop reason: SDUPTHER

## 2021-12-14 RX ORDER — POTASSIUM CHLORIDE 7.45 MG/ML
INJECTION INTRAVENOUS
Status: DISPENSED
Start: 2021-12-14 | End: 2021-12-15

## 2021-12-14 RX ORDER — CHLORHEXIDINE GLUCONATE 0.12 MG/ML
15 RINSE ORAL EVERY 12 HOURS
Status: DISCONTINUED | OUTPATIENT
Start: 2021-12-14 | End: 2021-12-14

## 2021-12-14 RX ORDER — OXYCODONE HYDROCHLORIDE 5 MG/1
10 TABLET ORAL EVERY 4 HOURS PRN
Status: DISCONTINUED | OUTPATIENT
Start: 2021-12-14 | End: 2021-12-16

## 2021-12-14 RX ORDER — PANTOPRAZOLE SODIUM 40 MG/1
40 TABLET, DELAYED RELEASE ORAL
Status: DISCONTINUED | OUTPATIENT
Start: 2021-12-15 | End: 2021-12-14

## 2021-12-14 RX ORDER — FENTANYL CITRATE 50 UG/ML
INJECTION, SOLUTION INTRAMUSCULAR; INTRAVENOUS AS NEEDED
Status: DISCONTINUED | OUTPATIENT
Start: 2021-12-14 | End: 2021-12-14 | Stop reason: SURG

## 2021-12-14 RX ORDER — HEPARIN SODIUM 1000 [USP'U]/ML
INJECTION, SOLUTION INTRAVENOUS; SUBCUTANEOUS AS NEEDED
Status: DISCONTINUED | OUTPATIENT
Start: 2021-12-14 | End: 2021-12-14 | Stop reason: SURG

## 2021-12-14 RX ORDER — ALPRAZOLAM 0.25 MG/1
0.25 TABLET ORAL EVERY 8 HOURS PRN
Status: DISCONTINUED | OUTPATIENT
Start: 2021-12-14 | End: 2021-12-16

## 2021-12-14 RX ORDER — ASPIRIN 81 MG/1
81 TABLET ORAL EVERY MORNING
COMMUNITY

## 2021-12-14 RX ORDER — MAGNESIUM SULFATE HEPTAHYDRATE 40 MG/ML
2 INJECTION, SOLUTION INTRAVENOUS AS NEEDED
Status: DISCONTINUED | OUTPATIENT
Start: 2021-12-14 | End: 2021-12-17

## 2021-12-14 RX ORDER — NALOXONE HCL 0.4 MG/ML
0.4 VIAL (ML) INJECTION
Status: DISCONTINUED | OUTPATIENT
Start: 2021-12-21 | End: 2021-12-17

## 2021-12-14 RX ORDER — MEPERIDINE HYDROCHLORIDE 25 MG/ML
25 INJECTION INTRAMUSCULAR; INTRAVENOUS; SUBCUTANEOUS EVERY 4 HOURS PRN
Status: DISCONTINUED | OUTPATIENT
Start: 2021-12-14 | End: 2021-12-15

## 2021-12-14 RX ORDER — HYDRALAZINE HYDROCHLORIDE 20 MG/ML
10 INJECTION INTRAMUSCULAR; INTRAVENOUS EVERY 6 HOURS PRN
Status: DISCONTINUED | OUTPATIENT
Start: 2021-12-14 | End: 2021-12-15

## 2021-12-14 RX ORDER — SODIUM CHLORIDE 0.9 % (FLUSH) 0.9 %
30 SYRINGE (ML) INJECTION ONCE AS NEEDED
Status: DISCONTINUED | OUTPATIENT
Start: 2021-12-14 | End: 2021-12-14 | Stop reason: HOSPADM

## 2021-12-14 RX ORDER — HYDROCODONE BITARTRATE AND ACETAMINOPHEN 10; 325 MG/1; MG/1
1 TABLET ORAL EVERY 4 HOURS PRN
Status: DISCONTINUED | OUTPATIENT
Start: 2021-12-14 | End: 2021-12-16

## 2021-12-14 RX ORDER — ALBUMIN, HUMAN INJ 5% 5 %
1500 SOLUTION INTRAVENOUS AS NEEDED
Status: DISCONTINUED | OUTPATIENT
Start: 2021-12-14 | End: 2021-12-15

## 2021-12-14 RX ORDER — ACETAMINOPHEN 650 MG/1
325 SUPPOSITORY RECTAL EVERY 6 HOURS PRN
Status: DISCONTINUED | OUTPATIENT
Start: 2021-12-14 | End: 2021-12-15

## 2021-12-14 RX ORDER — MAGNESIUM SULFATE HEPTAHYDRATE 40 MG/ML
4 INJECTION, SOLUTION INTRAVENOUS AS NEEDED
Status: DISCONTINUED | OUTPATIENT
Start: 2021-12-14 | End: 2021-12-17

## 2021-12-14 RX ORDER — SODIUM CHLORIDE 9 MG/ML
30 INJECTION, SOLUTION INTRAVENOUS CONTINUOUS PRN
Status: DISCONTINUED | OUTPATIENT
Start: 2021-12-14 | End: 2021-12-15

## 2021-12-14 RX ORDER — ACETAMINOPHEN 160 MG/5ML
650 SOLUTION ORAL EVERY 6 HOURS PRN
Status: DISCONTINUED | OUTPATIENT
Start: 2021-12-14 | End: 2021-12-15

## 2021-12-14 RX ORDER — SODIUM CHLORIDE 9 MG/ML
INJECTION, SOLUTION INTRAVENOUS CONTINUOUS PRN
Status: DISCONTINUED | OUTPATIENT
Start: 2021-12-14 | End: 2021-12-14 | Stop reason: SURG

## 2021-12-14 RX ORDER — CHLORHEXIDINE GLUCONATE 0.12 MG/ML
15 RINSE ORAL EVERY 12 HOURS SCHEDULED
Status: DISCONTINUED | OUTPATIENT
Start: 2021-12-14 | End: 2021-12-14

## 2021-12-14 RX ORDER — LISINOPRIL 40 MG/1
40 TABLET ORAL EVERY MORNING
COMMUNITY
End: 2021-12-21 | Stop reason: HOSPADM

## 2021-12-14 RX ORDER — ONDANSETRON 2 MG/ML
INJECTION INTRAMUSCULAR; INTRAVENOUS AS NEEDED
Status: DISCONTINUED | OUTPATIENT
Start: 2021-12-14 | End: 2021-12-14 | Stop reason: SURG

## 2021-12-14 RX ORDER — SODIUM CHLORIDE 0.9 % (FLUSH) 0.9 %
10 SYRINGE (ML) INJECTION AS NEEDED
Status: DISCONTINUED | OUTPATIENT
Start: 2021-12-14 | End: 2021-12-21 | Stop reason: HOSPADM

## 2021-12-14 RX ORDER — PANTOPRAZOLE SODIUM 40 MG/10ML
40 INJECTION, POWDER, LYOPHILIZED, FOR SOLUTION INTRAVENOUS ONCE
Status: DISCONTINUED | OUTPATIENT
Start: 2021-12-15 | End: 2021-12-14

## 2021-12-14 RX ORDER — METOCLOPRAMIDE HYDROCHLORIDE 5 MG/ML
10 INJECTION INTRAMUSCULAR; INTRAVENOUS EVERY 6 HOURS
Status: DISCONTINUED | OUTPATIENT
Start: 2021-12-14 | End: 2021-12-14 | Stop reason: SDUPTHER

## 2021-12-14 RX ORDER — POLYETHYLENE GLYCOL 3350 17 G/17G
17 POWDER, FOR SOLUTION ORAL DAILY PRN
Status: DISCONTINUED | OUTPATIENT
Start: 2021-12-14 | End: 2021-12-17

## 2021-12-14 RX ORDER — NITROGLYCERIN 20 MG/100ML
10-50 INJECTION INTRAVENOUS
Status: DISCONTINUED | OUTPATIENT
Start: 2021-12-14 | End: 2021-12-15

## 2021-12-14 RX ORDER — CHLORHEXIDINE GLUCONATE 0.12 MG/ML
15 RINSE ORAL EVERY 12 HOURS SCHEDULED
Status: DISCONTINUED | OUTPATIENT
Start: 2021-12-15 | End: 2021-12-21 | Stop reason: HOSPADM

## 2021-12-14 RX ORDER — HYDROCODONE BITARTRATE AND ACETAMINOPHEN 5; 325 MG/1; MG/1
1 TABLET ORAL EVERY 4 HOURS PRN
Status: DISCONTINUED | OUTPATIENT
Start: 2021-12-14 | End: 2021-12-21 | Stop reason: HOSPADM

## 2021-12-14 RX ORDER — ACETAMINOPHEN 160 MG/5ML
650 SOLUTION ORAL EVERY 4 HOURS
Status: DISCONTINUED | OUTPATIENT
Start: 2021-12-14 | End: 2021-12-15

## 2021-12-14 RX ORDER — NOREPINEPHRINE BIT/0.9 % NACL 8 MG/250ML
.02-.3 INFUSION BOTTLE (ML) INTRAVENOUS CONTINUOUS PRN
Status: DISCONTINUED | OUTPATIENT
Start: 2021-12-14 | End: 2021-12-16

## 2021-12-14 RX ORDER — HYDRALAZINE HYDROCHLORIDE 20 MG/ML
10 INJECTION INTRAMUSCULAR; INTRAVENOUS EVERY 6 HOURS PRN
Status: DISCONTINUED | OUTPATIENT
Start: 2021-12-14 | End: 2021-12-14 | Stop reason: SDUPTHER

## 2021-12-14 RX ORDER — PANTOPRAZOLE SODIUM 40 MG/1
40 TABLET, DELAYED RELEASE ORAL EVERY MORNING
Status: COMPLETED | OUTPATIENT
Start: 2021-12-15 | End: 2021-12-17

## 2021-12-14 RX ORDER — NOREPINEPHRINE BIT/0.9 % NACL 8 MG/250ML
INFUSION BOTTLE (ML) INTRAVENOUS CONTINUOUS PRN
Status: DISCONTINUED | OUTPATIENT
Start: 2021-12-14 | End: 2021-12-14

## 2021-12-14 RX ORDER — ONDANSETRON 2 MG/ML
4 INJECTION INTRAMUSCULAR; INTRAVENOUS EVERY 6 HOURS PRN
Status: DISCONTINUED | OUTPATIENT
Start: 2021-12-14 | End: 2021-12-14 | Stop reason: SDUPTHER

## 2021-12-14 RX ORDER — MAGNESIUM SULFATE 1 G/100ML
1 INJECTION INTRAVENOUS EVERY 8 HOURS
Status: DISCONTINUED | OUTPATIENT
Start: 2021-12-14 | End: 2021-12-14 | Stop reason: SDUPTHER

## 2021-12-14 RX ORDER — METOCLOPRAMIDE HYDROCHLORIDE 5 MG/ML
10 INJECTION INTRAMUSCULAR; INTRAVENOUS EVERY 6 HOURS
Status: COMPLETED | OUTPATIENT
Start: 2021-12-15 | End: 2021-12-15

## 2021-12-14 RX ORDER — ASPIRIN 81 MG/1
81 TABLET ORAL DAILY
Status: DISCONTINUED | OUTPATIENT
Start: 2021-12-15 | End: 2021-12-21 | Stop reason: HOSPADM

## 2021-12-14 RX ORDER — ACETAMINOPHEN 325 MG/1
650 TABLET ORAL EVERY 4 HOURS
Status: DISCONTINUED | OUTPATIENT
Start: 2021-12-14 | End: 2021-12-15

## 2021-12-14 RX ORDER — ACETAMINOPHEN 650 MG
TABLET, EXTENDED RELEASE ORAL AS NEEDED
Status: DISCONTINUED | OUTPATIENT
Start: 2021-12-14 | End: 2021-12-14 | Stop reason: HOSPADM

## 2021-12-14 RX ORDER — ATORVASTATIN CALCIUM 80 MG/1
80 TABLET, FILM COATED ORAL NIGHTLY
COMMUNITY
End: 2022-01-13 | Stop reason: SDUPTHER

## 2021-12-14 RX ORDER — ACETAMINOPHEN 160 MG/5ML
650 SOLUTION ORAL EVERY 4 HOURS PRN
Status: DISCONTINUED | OUTPATIENT
Start: 2021-12-15 | End: 2021-12-15 | Stop reason: SDUPTHER

## 2021-12-14 RX ORDER — MAGNESIUM SULFATE HEPTAHYDRATE 40 MG/ML
4 INJECTION, SOLUTION INTRAVENOUS AS NEEDED
Status: DISCONTINUED | OUTPATIENT
Start: 2021-12-14 | End: 2021-12-14 | Stop reason: SDUPTHER

## 2021-12-14 RX ORDER — BISACODYL 5 MG/1
10 TABLET, DELAYED RELEASE ORAL DAILY PRN
Status: DISCONTINUED | OUTPATIENT
Start: 2021-12-14 | End: 2021-12-21 | Stop reason: HOSPADM

## 2021-12-14 RX ORDER — PANTOPRAZOLE SODIUM 40 MG/10ML
40 INJECTION, POWDER, LYOPHILIZED, FOR SOLUTION INTRAVENOUS ONCE
Status: COMPLETED | OUTPATIENT
Start: 2021-12-14 | End: 2021-12-14

## 2021-12-14 RX ORDER — NITROGLYCERIN 20 MG/100ML
INJECTION INTRAVENOUS CONTINUOUS PRN
Status: DISCONTINUED | OUTPATIENT
Start: 2021-12-14 | End: 2021-12-14 | Stop reason: SURG

## 2021-12-14 RX ORDER — PANTOPRAZOLE SODIUM 40 MG/1
40 TABLET, DELAYED RELEASE ORAL EVERY MORNING
Status: DISCONTINUED | OUTPATIENT
Start: 2021-12-15 | End: 2021-12-14

## 2021-12-14 RX ORDER — SODIUM CHLORIDE 9 MG/ML
30 INJECTION, SOLUTION INTRAVENOUS CONTINUOUS PRN
Status: DISCONTINUED | OUTPATIENT
Start: 2021-12-14 | End: 2021-12-16

## 2021-12-14 RX ORDER — NOREPINEPHRINE BIT/0.9 % NACL 8 MG/250ML
INFUSION BOTTLE (ML) INTRAVENOUS CONTINUOUS PRN
Status: DISCONTINUED | OUTPATIENT
Start: 2021-12-14 | End: 2021-12-14 | Stop reason: SURG

## 2021-12-14 RX ORDER — ACETAMINOPHEN 325 MG/1
650 TABLET ORAL EVERY 6 HOURS PRN
Status: DISCONTINUED | OUTPATIENT
Start: 2021-12-14 | End: 2021-12-15

## 2021-12-14 RX ORDER — MAGNESIUM SULFATE 1 G/100ML
1 INJECTION INTRAVENOUS EVERY 8 HOURS
Status: COMPLETED | OUTPATIENT
Start: 2021-12-15 | End: 2021-12-15

## 2021-12-14 RX ORDER — BISACODYL 10 MG
10 SUPPOSITORY, RECTAL RECTAL DAILY PRN
Status: DISCONTINUED | OUTPATIENT
Start: 2021-12-15 | End: 2021-12-21 | Stop reason: HOSPADM

## 2021-12-14 RX ORDER — CEFAZOLIN SODIUM 1 G/3ML
INJECTION, POWDER, FOR SOLUTION INTRAMUSCULAR; INTRAVENOUS AS NEEDED
Status: DISCONTINUED | OUTPATIENT
Start: 2021-12-14 | End: 2021-12-14 | Stop reason: SURG

## 2021-12-14 RX ORDER — SODIUM CHLORIDE 0.9 % (FLUSH) 0.9 %
10 SYRINGE (ML) INJECTION EVERY 12 HOURS SCHEDULED
Status: DISCONTINUED | OUTPATIENT
Start: 2021-12-14 | End: 2021-12-21 | Stop reason: HOSPADM

## 2021-12-14 RX ORDER — ATORVASTATIN CALCIUM 40 MG/1
40 TABLET, FILM COATED ORAL NIGHTLY
Status: DISCONTINUED | OUTPATIENT
Start: 2021-12-14 | End: 2021-12-21 | Stop reason: HOSPADM

## 2021-12-14 RX ORDER — CYCLOBENZAPRINE HCL 10 MG
10 TABLET ORAL EVERY 8 HOURS PRN
Status: DISCONTINUED | OUTPATIENT
Start: 2021-12-15 | End: 2021-12-14 | Stop reason: SDUPTHER

## 2021-12-14 RX ORDER — PHENYLEPHRINE HYDROCHLORIDE 10 MG/ML
INJECTION INTRAVENOUS AS NEEDED
Status: DISCONTINUED | OUTPATIENT
Start: 2021-12-14 | End: 2021-12-14

## 2021-12-14 RX ORDER — MORPHINE SULFATE 4 MG/ML
1 INJECTION, SOLUTION INTRAMUSCULAR; INTRAVENOUS EVERY 4 HOURS PRN
Status: DISCONTINUED | OUTPATIENT
Start: 2021-12-14 | End: 2021-12-16

## 2021-12-14 RX ADMIN — HYDRALAZINE HYDROCHLORIDE 10 MG: 20 INJECTION INTRAMUSCULAR; INTRAVENOUS at 12:14

## 2021-12-14 RX ADMIN — ASPIRIN 81 MG CHEWABLE TABLET 81 MG: 81 TABLET CHEWABLE at 08:27

## 2021-12-14 RX ADMIN — Medication 25 MG: at 14:34

## 2021-12-14 RX ADMIN — DEXMEDETOMIDINE HYDROCHLORIDE 0.5 MCG/KG/HR: 100 INJECTION, SOLUTION INTRAVENOUS at 14:27

## 2021-12-14 RX ADMIN — PHENYLEPHRINE HYDROCHLORIDE 100 MCG: 10 INJECTION INTRAVENOUS at 15:34

## 2021-12-14 RX ADMIN — MUPIROCIN 1 APPLICATION: 20 OINTMENT TOPICAL at 11:52

## 2021-12-14 RX ADMIN — VECURONIUM BROMIDE 4 MG: 1 INJECTION, POWDER, LYOPHILIZED, FOR SOLUTION INTRAVENOUS at 15:44

## 2021-12-14 RX ADMIN — NITROGLYCERIN 20 MCG/MIN: 20 INJECTION INTRAVENOUS at 15:29

## 2021-12-14 RX ADMIN — ONDANSETRON 4 MG: 2 INJECTION INTRAMUSCULAR; INTRAVENOUS at 17:28

## 2021-12-14 RX ADMIN — ACETAMINOPHEN ORAL SOLUTION 650 MG: 650 SOLUTION ORAL at 22:29

## 2021-12-14 RX ADMIN — SODIUM CHLORIDE: 0.9 INJECTION, SOLUTION INTRAVENOUS at 14:20

## 2021-12-14 RX ADMIN — Medication 25 MG: at 14:27

## 2021-12-14 RX ADMIN — VECURONIUM BROMIDE 10 MG: 1 INJECTION, POWDER, LYOPHILIZED, FOR SOLUTION INTRAVENOUS at 14:34

## 2021-12-14 RX ADMIN — VECURONIUM BROMIDE 4 MG: 1 INJECTION, POWDER, LYOPHILIZED, FOR SOLUTION INTRAVENOUS at 16:09

## 2021-12-14 RX ADMIN — CEFAZOLIN SODIUM 2 G: 1 INJECTION, POWDER, FOR SOLUTION INTRAMUSCULAR; INTRAVENOUS at 17:14

## 2021-12-14 RX ADMIN — Medication 0.02 MCG/KG/MIN: at 15:34

## 2021-12-14 RX ADMIN — ATORVASTATIN CALCIUM 40 MG: 40 TABLET, FILM COATED ORAL at 22:29

## 2021-12-14 RX ADMIN — FENTANYL CITRATE 250 MCG: 50 INJECTION, SOLUTION INTRAMUSCULAR; INTRAVENOUS at 16:50

## 2021-12-14 RX ADMIN — MAGNESIUM SULFATE HEPTAHYDRATE 2 G: 500 INJECTION, SOLUTION INTRAMUSCULAR; INTRAVENOUS at 17:04

## 2021-12-14 RX ADMIN — SODIUM CHLORIDE: 0.9 INJECTION, SOLUTION INTRAVENOUS at 16:00

## 2021-12-14 RX ADMIN — FENTANYL CITRATE 250 MCG: 50 INJECTION, SOLUTION INTRAMUSCULAR; INTRAVENOUS at 15:29

## 2021-12-14 RX ADMIN — ALBUMIN HUMAN 250 ML: 0.05 INJECTION, SOLUTION INTRAVENOUS at 19:37

## 2021-12-14 RX ADMIN — HYDROMORPHONE HYDROCHLORIDE 2 MG: 2 INJECTION, SOLUTION INTRAMUSCULAR; INTRAVENOUS; SUBCUTANEOUS at 17:44

## 2021-12-14 RX ADMIN — PANTOPRAZOLE SODIUM 40 MG: 40 INJECTION, POWDER, FOR SOLUTION INTRAVENOUS at 22:29

## 2021-12-14 RX ADMIN — CYCLOBENZAPRINE 10 MG: 10 TABLET, FILM COATED ORAL at 22:08

## 2021-12-14 RX ADMIN — PHENYLEPHRINE HYDROCHLORIDE 100 MCG: 10 INJECTION INTRAVENOUS at 15:20

## 2021-12-14 RX ADMIN — OXYCODONE 10 MG: 5 TABLET ORAL at 22:08

## 2021-12-14 RX ADMIN — PHENYLEPHRINE HYDROCHLORIDE 100 MCG: 10 INJECTION INTRAVENOUS at 14:47

## 2021-12-14 RX ADMIN — MIDAZOLAM 4 MG: 1 INJECTION INTRAMUSCULAR; INTRAVENOUS at 14:27

## 2021-12-14 RX ADMIN — FENTANYL CITRATE 250 MCG: 50 INJECTION, SOLUTION INTRAMUSCULAR; INTRAVENOUS at 14:34

## 2021-12-14 RX ADMIN — FENTANYL CITRATE 250 MCG: 50 INJECTION, SOLUTION INTRAMUSCULAR; INTRAVENOUS at 15:21

## 2021-12-14 RX ADMIN — WATER 2 G: 1000 INJECTION, SOLUTION INTRAVENOUS at 15:11

## 2021-12-14 RX ADMIN — DEXMEDETOMIDINE HYDROCHLORIDE 0.5 MCG/KG/HR: 4 INJECTION, SOLUTION INTRAVENOUS at 22:22

## 2021-12-14 RX ADMIN — AMINOCAPROIC ACID 10 G: 250 INJECTION, SOLUTION INTRAVENOUS at 17:16

## 2021-12-14 RX ADMIN — MUPIROCIN: 20 OINTMENT TOPICAL at 22:31

## 2021-12-14 RX ADMIN — MORPHINE SULFATE 1 MG: 4 INJECTION INTRAVENOUS at 20:46

## 2021-12-14 RX ADMIN — AMINOCAPROIC ACID 10 G: 250 INJECTION, SOLUTION INTRAVENOUS at 15:29

## 2021-12-14 RX ADMIN — NICARDIPINE HYDROCHLORIDE 5 MG/HR: 25 INJECTION, SOLUTION INTRAVENOUS at 18:45

## 2021-12-14 RX ADMIN — PHENYLEPHRINE HYDROCHLORIDE 100 MCG: 10 INJECTION INTRAVENOUS at 15:44

## 2021-12-14 RX ADMIN — PHENYLEPHRINE HYDROCHLORIDE 100 MCG: 10 INJECTION INTRAVENOUS at 15:07

## 2021-12-14 RX ADMIN — ALBUTEROL SULFATE 2 PUFF: 108 INHALANT RESPIRATORY (INHALATION) at 10:47

## 2021-12-14 RX ADMIN — METOCLOPRAMIDE 10 MG: 5 INJECTION, SOLUTION INTRAMUSCULAR; INTRAVENOUS at 22:30

## 2021-12-14 RX ADMIN — HEPARIN SODIUM 27000 UNITS: 1000 INJECTION INTRAVENOUS; SUBCUTANEOUS at 15:56

## 2021-12-14 RX ADMIN — HEPARIN SODIUM 13.7 UNITS/KG/HR: 10000 INJECTION, SOLUTION INTRAVENOUS at 03:37

## 2021-12-14 RX ADMIN — MAGNESIUM SULFATE HEPTAHYDRATE 1 G: 1 INJECTION, SOLUTION INTRAVENOUS at 22:30

## 2021-12-14 RX ADMIN — ALBUMIN HUMAN 250 ML: 0.05 INJECTION, SOLUTION INTRAVENOUS at 22:40

## 2021-12-14 RX ADMIN — PHENYLEPHRINE HYDROCHLORIDE 100 MCG: 10 INJECTION INTRAVENOUS at 14:57

## 2021-12-14 RX ADMIN — WATER 2 G: 1000 INJECTION, SOLUTION INTRAVENOUS at 22:31

## 2021-12-14 RX ADMIN — SODIUM CHLORIDE 4 UNITS/HR: 900 INJECTION INTRAVENOUS at 15:50

## 2021-12-14 RX ADMIN — VECURONIUM BROMIDE 2 MG: 1 INJECTION, POWDER, LYOPHILIZED, FOR SOLUTION INTRAVENOUS at 16:56

## 2021-12-14 RX ADMIN — METOPROLOL TARTRATE 12.5 MG: 25 TABLET, FILM COATED ORAL at 08:27

## 2021-12-14 RX ADMIN — MIDAZOLAM 4 MG: 1 INJECTION INTRAMUSCULAR; INTRAVENOUS at 14:34

## 2021-12-14 RX ADMIN — PROTAMINE SULFATE 350 MG: 10 INJECTION, SOLUTION INTRAVENOUS at 17:18

## 2021-12-14 RX ADMIN — SODIUM CHLORIDE 3 UNITS: 900 INJECTION INTRAVENOUS at 16:37

## 2021-12-14 RX ADMIN — CHLORHEXIDINE GLUCONATE 15 ML: 1.2 SOLUTION ORAL at 11:52

## 2021-12-14 NOTE — OP NOTE
Operative Note    Date of Dictation: 12/14/21    Date of Procedure: Same    Referring Physician: Treasure Dickinson APRN    Preoperative diagnosis:   1.  Severe three-vessel coronary artery disease  2.  Non-ST elevation MI  3.  Angina pectoris    Postoperative diagnosis:   Same    Procedure:   1.  Urgent CABG x4 with a LIMA to the mid LAD and reverse individual saphenous vein graft to the PDA, and OM 1 and 2  2. EVH of the right legs    Surgeon: Samuel Berger MD     Assistants: GONZALEZ Candelaria and Luanne Romero M.D. Assistant: Connor Solitario PA-C was responsible for performing the following activities: Retraction, Suction, Irrigation, Suturing, Closing, Placing Dressing, Harvesting of Vessels, Bypass Grafting and All aspects of the cardiac case and their skilled assistance was necessary for the success of this case.    Anesthesia: General endotracheal anesthesia and ANJEL    Findings:  The saphenous vein was harvested endoscopically form the right  leg. The vein had a diameter of 4 mm and was of fair quality. The coronaries had diameters between 1.5 and 1.75 mm.    Estimated Blood Loss: Approximately 400 cc, most of it recorded with a Cell Saver device and cardiotomy suckers I was retransfused to the patient    STS Data:    The patient was explained the risks (STS risk score calculated), benefits and alternatives of surgery and agreed to proceed. The antibiotics and b blockers were given in the STS required window.  Consent was given about diet, alcohol and tobacco use as needed        Description of the procedure:     The patient was placed supine on the operative table. General anesthesia was given and lines placed. The patient was prepped and draped using the usual sterile technique. A median sternotomy was performed with a scalpel and the layers carried down to the sternum using the electrocautery. The sternum was split in the midline using a vertical oscillating saw. Hemostasis was achieved. The LIMA  was harvested as a pedicle and prepared with papaverine. It was of good quality. 300 units/kg of IV heparin was given to an ACT over 400. A Favaloro retractor was placed and the mediastinum exposed, the pericardium was opened and the edges tacked to the wound. Cannulation sutures were placed in the ascending aorta and right atrium. Small cannulas were placed and aorto right atrial cardiopulmonary bypass was started. Cardioplegia cannulas were placed and then the ascending aorta was clamped. One liter of cold blood cardioplegia was given in an antegrade fashion to achieved diastolic arrest and further doses every 15 minutes thereafter. Constructions of grafts were done in the sequence distal - proximal between the reversedsaphenousveingraft and each coronary targets. Grafts were constructed to the PDA, OM1 and OM2 coronary arteries and plegia was given between graft sequences after de-airing the aortic root and tying the proximal anastomosis. The LIMA was anastomosed to the mid LAD using 7.0 prolene continuous suture with a small needle, then the warm dose of cardioplegia was given and then the aortic clamp removed as well as the LIMA bulldog clamp. All anastomoses were checked and had good flow and morphology. The left pleural space was suctioned and the lungs ventilated. The heart was paced till regular atrial rhythm resumed. I allowed the heart to eject and once hemodynamics were acceptable, then the CPB was discontinued and the venous and cardioplegia cannulas removed. The matching dose of protamine was given and the aortic cannula removed as well. AV temporary wires and pleural and mediastinal chest tubes were placed and the wound sprayed with platelet rich plasma.  The sternum was closed with single and double wires and soft tissue in layers of reabsorbable material. The wounds were covered with sterile dressings.       Specimen removed: None    CPB time: 60 minutes    Aortic clamp time: 48  minutes    Complications:  none           Disposition: Cardiovascular recovery room           Condition: Critical but stable.

## 2021-12-14 NOTE — ANESTHESIA POSTPROCEDURE EVALUATION
Patient: Saundra Holder    Procedure Summary     Date: 12/14/21 Room / Location: [facility] CVOR 01 / [facility] CVOR    Anesthesia Start: 1422 Anesthesia Stop: 1823    Procedure: CORONARY ARTERY BYPASS GRAFTING (N/A Chest) Diagnosis:       CAD, multiple vessel      (CAD, multiple vessel [I25.10])    Surgeons: Samuel Berger MD Provider: Mushtaq Samuels MD    Anesthesia Type: general ASA Status: 4          Anesthesia Type: general    Vitals  Vitals Value Taken Time   BP     Temp 94.46 °F (34.7 °C) 12/14/21 1831   Pulse 76 12/14/21 1831   Resp     SpO2 95 % 12/14/21 1831   Vitals shown include unvalidated device data.        Post Anesthesia Care and Evaluation    Patient location during evaluation: ICU  Patient participation: complete - patient cannot participate  Level of consciousness: obtunded/minimal responses  Pain scale: See nurse's notes for pain score.  Pain management: adequate  Airway patency: patent  Anesthetic complications: No anesthetic complications  PONV Status: none  Cardiovascular status: acceptable  Respiratory status: acceptable  Hydration status: acceptable    Comments: Patient seen and examined postoperatively; vital signs stable; SpO2 greater than or equal to 90%; cardiopulmonary status stable; nausea/vomiting adequately controlled; pain adequately controlled; no apparent anesthesia complications; patient discharged from anesthesia care when discharge criteria were met

## 2021-12-14 NOTE — ANESTHESIA PROCEDURE NOTES
Procedure Performed: Emergent/Open-Heart Anesthesia ANJEL     Start Time:        End Time:      Preanesthesia Checklist:  Patient identified, IV assessed, risks and benefits discussed, monitors and equipment assessed, procedure being performed at surgeon's request and anesthesia consent obtained.    General Procedure Information  Diagnostic Indications for Echo:  assessment of surgical repair and hemodynamic monitoring  Location performed:  OR  Intubated  Bite block placed  Heart visualized  Probe Insertion:  Easy  Probe Type:  Biplane and multiplane  Modalities:  Color flow mapping, pulse wave Doppler and continuous wave Doppler    Echocardiographic and Doppler Measurements    Ventricles    Right Ventricle:  Cavity size normal.  Hypertrophy not present.  Thrombus not present.  Global function normal.  Ejection Fraction 60%.    Left Ventricle:  Cavity size normal.  Hypertrophy not present.  Thrombus not present.  Global Function normal.  Ejection Fraction 60%.      Ventricular Regional Function:  1- Basal Anteroseptal:  normal  2- Basal Anterior:  normal  3- Basal Anterolateral:  normal  4- Basal Inferolateral:  normal  5- Basal Inferior:  normal  6- Basal Inferoseptal:  normal  7- Mid Anteroseptal:  normal  8- Mid Anterior:  normal  9- Mid Anterolateral:  normal  10- Mid Inferolateral:  normal  11- Mid Inferior:  normal  12- Mid Inferoseptal:  normal  13- Apical Anterior:  normal  14- Apical Lateral:  normal  15- Apical Inferior:  normal  16- Apical Septal:  normal  17- [place]:  normal      Valves    Aortic Valve:  Annulus normal.  Stenosis not present.  Regurgitation absent.  Leaflets normal.  Leaflet motions normal.      Mitral Valve:  Annulus normal.  Stenosis not present.  Regurgitation trace.  Leaflets normal.  Leaflet motions normal.      Tricuspid Valve:  Annulus normal.  Stenosis not present.  Regurgitation absent.  Leaflets normal.  Leaflet motions normal.    Pulmonic Valve:  Annulus normal.  Stenosis not  present.  Regurgitation absent.          Aorta    Ascending Aorta:  Size normal.  Dissection not present.  Plaque thickness less than 3 mm.  Mobile plaque not present.    Aortic Arch:  Size normal.  Dissection not present.  Plaque thickness less than 3 mm.  Mobile plaque not present.    Descending Aorta:  Size normal.  Dissection not present.  Plaque thickness less than 3 mm.  Mobile plaque not present.          Atria    Right Atrium:  Size normal.    Left Atrium:  Size normal.  Spontaneous echo contrast not present.  Thrombus not present.  Tumor not present.  Device not present.    Left atrial appendage normal.      Septa    Atrial Septum:  Intra-atrial septal morphology normal.      Ventricular Septum:  Intra-ventricular septum morphology normal.          Other Findings  Pleural Effusion:  none  Pulmonary Arteries:  normal      Anesthesia Information  Performed Personally  Anesthesiologist:  Mushtaq Samuels MD      Echocardiogram Comments:       ANJEL placed easily, no resistance, lubricated, bite guard       Pre cpb ANJEL  Nl LV and RV SF   Mv, av ,tv wnl     S/p cpb no change

## 2021-12-14 NOTE — ANESTHESIA PROCEDURE NOTES
Airway  Urgency: elective    Date/Time: 12/14/2021 2:38 PM  Airway not difficult    General Information and Staff    Patient location during procedure: OR  Anesthesiologist: Mushtaq Samuels MD    Indications and Patient Condition  Indications for airway management: airway protection    Preoxygenated: yes  MILS not maintained throughout  Mask difficulty assessment: 1 - vent by mask    Final Airway Details  Final airway type: endotracheal airway      Successful airway: ETT  Cuffed: yes   Successful intubation technique: direct laryngoscopy  Endotracheal tube insertion site: oral  Blade: Anup  Blade size: 4  ETT size (mm): 7.5  Cormack-Lehane Classification: grade I - full view of glottis  Placement verified by: capnometry and palpation of cuff   Measured from: lips  ETT/EBT  to lips (cm): 21  Number of attempts at approach: 1  Assessment: lips, teeth, and gum same as pre-op and atraumatic intubation    Additional Comments  ASA monitors applied; preoxygenated with 100% FiO2 via anesthesia face mask; induction of general anesthesia; bag-mask ventilation; patient's position optimized; laryngoscopy; cuffed ETT lubricated with lidocaine jelly and placed into the trachea; cuff inflated to seal with minimally occlusive airway cuff pressure; ETT connected to anesthesia circuit; atraumatic/dentition in preoperative condition; ETT secured in place; correct placement in the trachea confirmed by bilateral chest rise, tube condensation, and return of EtCO2 > 30 mmHg x3

## 2021-12-14 NOTE — PAYOR COMM NOTE
"Direct urgent transfer admit on 12/13 from other hospital for [facility] evaluation. Urgent CABG workup initiated and pt taken to OR this morning (12/14).    Saundra Schilling (51 y.o. Female)             Date of Birth Social Security Number Address Home Phone MRN    [id number]  [id number]  [place] [address] [phone] [id number]    Yazidi Marital Status             None        Admission Date Admission Type Admitting Provider Attending Provider Department, Room/Bed    12/13/21 Urgent Samuel Berger MD Pagni, Sebastian, MD [facility] CARDIOVASCULAR CARE UNIT, 2205/1    Discharge Date Discharge Disposition Discharge Destination                         Attending Provider: Samuel Berger MD    Allergies: Nitrofuran Derivatives    Isolation: None   Infection: None   Code Status: Not on file   Advance Care Planning Activity    Ht: 165.1 cm (65\")   Wt: 89.8 kg (198 lb)    Admission Cmt: None   Principal Problem: None                Active Insurance as of 12/13/2021     Primary Coverage     Payor Plan Insurance Group Employer/Plan Group    [employer] Payment [employer] PPO [id number]     Payor Plan Address Payor Plan Phone Number Payor Plan Fax Number Effective Dates    [phone]  1/1/2021 - None Entered    [facility] [address]       Subscriber Name Subscriber Birth Date Member ID       SAUNDRA SCHILLING 1970 [id number]                 Emergency Contacts      (Rel.) Home Phone Work Phone Mobile Phone    ELISA SCHILLING (Spouse) [phone] -- [phone]    Bairon Schilling (Son) [phone] -- --            History & Physical    No notes of this type exist for this encounter.           Physician Progress Notes (last 48 hours)  Notes from 12/12/21 1132 through 12/14/21 1132   No notes of this type exist for this encounter.       Consult Notes (last 48 hours)  Notes from 12/12/21 1132 through 12/14/21 1132   No notes of this type exist for " this encounter.

## 2021-12-14 NOTE — ANESTHESIA PROCEDURE NOTES
Central Line      Patient reassessed immediately prior to procedure    Start time: 12/14/2021 2:48 PM  Stop Time:12/14/2021 3:00 PM  Staff  Anesthesiologist: Mushtaq Samuels MD  Preanesthetic Checklist  Completed: patient identified, IV checked, site marked, risks and benefits discussed, surgical consent, monitors and equipment checked, pre-op evaluation and timeout performed  Central Line Prep  Sterile Tech:gloves, cap, gown, mask and sterile barriers  Prep: chloraprep  Patient monitoring: blood pressure monitoring, continuous pulse oximetry and EKG  Central Line Procedure  Laterality:right  Location:internal jugular  Catheter Type:Cordis and MAC  Catheter Size:9 Fr  Guidance:ultrasound guided, landmark technique and palpation technique  PROCEDURE NOTE/ULTRASOUND INTERPRETATION.  Using ultrasound guidance the potential vascular sites for insertion of the catheter were visualized to determine the patency of the vessel to be used for vascular access.  After selecting the appropriate site for insertion, the needle was visualized under ultrasound being inserted into the internal jugular vein, followed by ultrasound confirmation of wire and catheter placement. There were no abnormalities seen on ultrasound; an image was taken; and the patient tolerated the procedure with no complications. Images: still images not obtained  Assessment  Post procedure:biopatch applied, line sutured and occlusive dressing applied  Assessement:blood return through all ports, free fluid flow, chest x-ray ordered and Sascha Test  Complications:no  Patient Tolerance:patient tolerated the procedure well with no apparent complications  Additional Notes  Sterile prep, drape, gown and gloves.  Negative sascha negative carotid, no difficulties.  Tolerated well.

## 2021-12-14 NOTE — H&P
Patient Care Team:  Treasure Dickinson APRN as PCP - General (Nurse Practitioner)    Chief complaint: Chest pain    Subjective     History of Present Illness:    Mrs. Holder is a 51 year-old female with PMH of HTN, HLD, and DM who presented to the ED in [place], [place] with a chief complaint of chest pain. She describes the chest pain as a heaviness in the center of her chest that radiates to her left arm and is associated with shortness of breath. It first started several weeks ago, but has gotten progressively worse. She states it initially started with activity and improved with rest, but now even occurs at rest. In the ED her troponin was found to be elevated and she was ruled in for a NSTEMI. She then underwent cardiac catheterization which revealed EF 50-55% and severe 3 vessel CAD. Patient was subsequently transferred to [facility] last night for surgical evaluation. Patient is on a Heparin drip and currently denies chest pain.     Review of Systems   Constitutional: Positive for activity change and fatigue.   Respiratory: Positive for chest tightness and shortness of breath.    Cardiovascular: Positive for chest pain.        History reviewed. No pertinent past medical history.   HTN  HLD  DM  Asthma    History reviewed. No pertinent surgical history.     History reviewed. No pertinent family history.   Mother had heart disease    Social History     Tobacco Use   • Smoking status: Never Smoker   • Smokeless tobacco: Never Used   Substance Use Topics   • Alcohol use: Not on file   • Drug use: Not on file     Medications Prior to Admission   Medication Sig Dispense Refill Last Dose   • lisinopril (PRINIVIL,ZESTRIL) 10 MG tablet Take 15 mg by mouth Every 12 (Twelve) Hours.   12/13/2021 at Unknown time   • metFORMIN (GLUCOPHAGE) 500 MG tablet Take 500 mg by mouth Daily With Breakfast.   12/13/2021 at Unknown time   • metoprolol tartrate (LOPRESSOR) 25 MG tablet Take 12.5 mg by mouth 2 (Two) Times a Day As  "Needed.   12/14/2021 at Unknown time     aspirin, 81 mg, Oral, Daily  ceFAZolin, 2 g, Intravenous, Once  chlorhexidine, 15 mL, Mouth/Throat, Q12H  Chlorhexidine Gluconate Cloth, 1 application, Topical, Q12H  insulin lispro, 0-7 Units, Subcutaneous, TID AC  metoprolol tartrate, 12.5 mg, Oral, Q12H  [START ON 12/15/2021] metoprolol tartrate, 12.5 mg, Oral, On Call to OR  mupirocin, 1 application, Each Nare, Q12H  sodium chloride, 10 mL, Intravenous, Q12H      Allergies:  Nitrofuran derivatives    Objective      Vital Signs  Temp:  [98.2 °F (36.8 °C)-98.5 °F (36.9 °C)] 98.4 °F (36.9 °C)  Heart Rate:  [61-83] 80  Resp:  [16-21] 16  BP: (104-175)/(50-97) 158/90    Flowsheet Rows      First Filed Value   Admission Height 165.1 cm (65\") Documented at 12/13/2021 1939   Admission Weight 93.1 kg (205 lb 4 oz) Documented at 12/13/2021 1939        165.1 cm (65\")    Physical Exam  Vitals and nursing note reviewed.   Constitutional:       General: She is not in acute distress.     Appearance: Normal appearance. She is obese. She is not ill-appearing or diaphoretic.   HENT:      Head: Normocephalic and atraumatic.      Nose: Nose normal.      Mouth/Throat:      Mouth: Mucous membranes are moist.      Pharynx: Oropharynx is clear.   Eyes:      Extraocular Movements: Extraocular movements intact.      Conjunctiva/sclera: Conjunctivae normal.      Pupils: Pupils are equal, round, and reactive to light.   Cardiovascular:      Rate and Rhythm: Normal rate and regular rhythm.      Heart sounds: No murmur heard.      Pulmonary:      Effort: Pulmonary effort is normal.      Breath sounds: Normal breath sounds.   Abdominal:      General: Bowel sounds are normal.      Palpations: Abdomen is soft.   Musculoskeletal:         General: Normal range of motion.      Cervical back: Normal range of motion and neck supple.      Right lower leg: No edema.      Left lower leg: No edema.   Skin:     General: Skin is warm and dry.   Neurological:      " General: No focal deficit present.      Mental Status: She is alert and oriented to person, place, and time.   Psychiatric:         Mood and Affect: Mood normal.         Behavior: Behavior normal.         Results Review:   Lab Results (last 24 hours)     Procedure Component Value Units Date/Time    Lipid Panel [[id number]] Collected: 12/14/21 0330    Specimen: Blood Updated: 12/14/21 0935    Hemoglobin A1c [[id number]] Collected: 12/14/21 0330    Specimen: Blood Updated: 12/14/21 0935    POC Glucose Once [[id number]]  (Abnormal) Collected: 12/14/21 0734    Specimen: Blood Updated: 12/14/21 0734     Glucose 137 mg/dL      Comment: Serial Number: [id number]:  [id number]       COVID PRE-OP / PRE-PROCEDURE SCREENING ORDER (NO ISOLATION) - Swab, Nasopharynx [[id number]]  (Normal) Collected: 12/14/21 0337    Specimen: Swab from Nasopharynx Updated: 12/14/21 0404    Narrative:      The following orders were created for panel order COVID PRE-OP / PRE-PROCEDURE SCREENING ORDER (NO ISOLATION) - Swab, Nasopharynx.  Procedure                               Abnormality         Status                     ---------                               -----------         ------                     COVID-19,CEPHEID/CHEYANNE,CO...[[id number]]  Normal              Final result                 Please view results for these tests on the individual orders.    COVID-19,CEPHEID/CHEYANNE,COR/GERA/PAD/LEANN IN-HOUSE(OR EMERGENT/ADD-ON),NP SWAB IN TRANSPORT MEDIA 3-4 HR TAT, RT-PCR - Swab, Nasopharynx [[id number]]  (Normal) Collected: 12/14/21 0337    Specimen: Swab from Nasopharynx Updated: 12/14/21 0404     COVID19 Not Detected    Narrative:      Fact sheet for providers: https://www.fda.gov/media/150281/download     Fact sheet for patients: https://www.fda.gov/media/072470/download  Fact sheet for providers: https://www.fda.gov/media/019850/download    Fact sheet for patients: https://www.fda.gov/media/824948/download    Test performed by PCR.    Basic  Metabolic Panel [[id number]]  (Abnormal) Collected: 12/14/21 0330    Specimen: Blood Updated: 12/14/21 0403     Glucose 144 mg/dL      BUN 20 mg/dL      Creatinine 0.58 mg/dL      Sodium 139 mmol/L      Potassium 4.2 mmol/L      Chloride 102 mmol/L      CO2 24.0 mmol/L      Calcium 9.0 mg/dL      eGFR Non African Amer 110 mL/min/1.73      BUN/Creatinine Ratio 34.5     Anion Gap 13.0 mmol/L     Narrative:      GFR Normal >60  Chronic Kidney Disease <60  Kidney Failure <15      Hepatic Function Panel [[id number]]  (Abnormal) Collected: 12/14/21 0330    Specimen: Blood Updated: 12/14/21 0403     Total Protein 6.8 g/dL      Albumin 3.40 g/dL      ALT (SGPT) 43 U/L      AST (SGOT) 39 U/L      Alkaline Phosphatase 93 U/L      Total Bilirubin 0.4 mg/dL      Bilirubin, Direct 0.2 mg/dL      Bilirubin, Indirect 0.2 mg/dL     aPTT [[id number]]  (Abnormal) Collected: 12/14/21 0330    Specimen: Blood Updated: 12/14/21 0402     PTT 46.5 seconds     Protime-INR [[id number]]  (Normal) Collected: 12/14/21 0330    Specimen: Blood Updated: 12/14/21 0402     Protime 11.1 Seconds      INR 1.00    CBC & Differential [[id number]]  (Abnormal) Collected: 12/14/21 0330    Specimen: Blood Updated: 12/14/21 0346    Narrative:      The following orders were created for panel order CBC & Differential.  Procedure                               Abnormality         Status                     ---------                               -----------         ------                     CBC Auto Differential[[id number]]        Abnormal            Final result                 Please view results for these tests on the individual orders.    CBC Auto Differential [[id number]]  (Abnormal) Collected: 12/14/21 0330    Specimen: Blood Updated: 12/14/21 0346     WBC 12.20 10*3/mm3      RBC 4.39 10*6/mm3      Hemoglobin 12.4 g/dL      Hematocrit 37.3 %      MCV 84.9 fL      MCH 28.3 pg      MCHC 33.3 g/dL      RDW 13.4 %      RDW-SD 40.3 fl      MPV 6.9 fL      Platelets  353 10*3/mm3      Neutrophil % 63.3 %      Lymphocyte % 25.6 %      Monocyte % 6.6 %      Eosinophil % 3.3 %      Basophil % 1.2 %      Neutrophils, Absolute 7.70 10*3/mm3      Lymphocytes, Absolute 3.10 10*3/mm3      Monocytes, Absolute 0.80 10*3/mm3      Eosinophils, Absolute 0.40 10*3/mm3      Basophils, Absolute 0.20 10*3/mm3      nRBC 0.1 /100 WBC     aPTT [[id number]]  (Abnormal) Collected: 12/13/21 2019    Specimen: Blood Updated: 12/13/21 2040     PTT 30.8 seconds     Protime-INR [[id number]]  (Normal) Collected: 12/13/21 2019    Specimen: Blood Updated: 12/13/21 2040     Protime 10.8 Seconds      INR 0.97    CBC & Differential [[id number]]  (Normal) Collected: 12/13/21 2019    Specimen: Blood Updated: 12/13/21 2026    Narrative:      The following orders were created for panel order CBC & Differential.  Procedure                               Abnormality         Status                     ---------                               -----------         ------                     CBC Auto Differential[[id number]]        Normal              Final result                 Please view results for these tests on the individual orders.    CBC Auto Differential [[id number]]  (Normal) Collected: 12/13/21 2019    Specimen: Blood Updated: 12/13/21 2026     WBC 9.90 10*3/mm3      RBC 4.26 10*6/mm3      Hemoglobin 12.0 g/dL      Hematocrit 35.6 %      MCV 83.6 fL      MCH 28.2 pg      MCHC 33.8 g/dL      RDW 13.2 %      RDW-SD 38.9 fl      MPV 6.8 fL      Platelets 341 10*3/mm3      Neutrophil % 68.2 %      Lymphocyte % 20.9 %      Monocyte % 6.7 %      Eosinophil % 2.9 %      Basophil % 1.3 %      Neutrophils, Absolute 6.80 10*3/mm3      Lymphocytes, Absolute 2.10 10*3/mm3      Monocytes, Absolute 0.70 10*3/mm3      Eosinophils, Absolute 0.30 10*3/mm3      Basophils, Absolute 0.10 10*3/mm3      nRBC 0.1 /100 WBC     POC Glucose Once [[id number]]  (Abnormal) Collected: 12/13/21 1921    Specimen: Blood Updated: 12/13/21 1922      Glucose 157 mg/dL      Comment: Serial Number: [id number]:  [id number]                   Assessment & Plan    · Severe 3 vessel CAD---on Heparin drip  · NSTEMI  · Normal LV systolic function---EF 50-55%  · HTN  · HLD  · Non-insulin dependent DM   · Asthma  · Obesity---BMI 32.95      Work-up ongoing for likely CABG. Vein mapping and carotid duplex are pending. Dr. Berger to review films and make final recommendation, but tentative plan for surgery this afternoon. NPO. Discontinue Heparin drip on call to OR. Urine pregnancy test pending, but was negative at [facility].        GONZALEZ Pitts  12/14/21  09:39 EST

## 2021-12-14 NOTE — ANESTHESIA PREPROCEDURE EVALUATION
Anesthesia Evaluation     Patient summary reviewed and Nursing notes reviewed   NPO Solid Status: > 8 hours  NPO Liquid Status: > 8 hours           Airway   Mallampati: II  TM distance: >3 FB  Neck ROM: full  No difficulty expected  Dental - normal exam     Pulmonary - normal exam   Cardiovascular - normal exam    ECG reviewed    (+) CAD,       Neuro/Psych  GI/Hepatic/Renal/Endo      Musculoskeletal     Abdominal  - normal exam    Bowel sounds: normal.   Substance History      OB/GYN          Other        ROS/Med Hx Other: SR       3v cad       IMPRESSION:  Linear left basilar subsegmental atelectasis and/or scarring.                   Anesthesia Plan    ASA 4     general   (Chandrika, central line, swan, tete)  intravenous induction   Postoperative Plan: Expected vent after surgery  Anesthetic plan, all risks, benefits, and alternatives have been provided, discussed and informed consent has been obtained with: patient.

## 2021-12-14 NOTE — DISCHARGE PLACEMENT REQUEST
"Saundra Schilling (51 y.o. Female)             Date of Birth Social Security Number Address Home Phone MRN    1970  [id number] [facility] [place] [address] [phone] [id number]    [employer] Marital Status             None        Admission Date Admission Type Admitting Provider Attending Provider Department, Room/Bed    12/13/21 Urgent Samuel Berger MD Pagni, Sebastian, MD [facility] CARDIOVASCULAR OPERATING ROOM, GERA CVOR/CVOR    Discharge Date Discharge Disposition Discharge Destination                         Attending Provider: Samuel Berger MD    Allergies: Nitrofuran Derivatives    Isolation: None   Infection: None   Code Status: Not on file   Advance Care Planning Activity    Ht: 165.1 cm (65\")   Wt: 89.8 kg (198 lb)    Admission Cmt: None   Principal Problem: None                Active Insurance as of 12/13/2021     Primary Coverage     Payor Plan Insurance Group Employer/Plan Group    ANTHEM BLUE CROSS ANTHEM BLUE CROSS BLUE SHIELD PPO [id number]     Payor Plan Address Payor Plan Phone Number Payor Plan Fax Number Effective Dates    [address] [phone]  1/1/2021 - None Entered    [facility] [id number]       Subscriber Name Subscriber Birth Date Member ID       SAUNDRA SCHILLING 1970 [id number]                 Emergency Contacts      (Rel.) Home Phone Work Phone Mobile Phone    ELISA SCHILLING (Spouse) [phone] -- [phone]    Bairon Schilling (Son) [phone] -- --            "

## 2021-12-14 NOTE — ANESTHESIA PROCEDURE NOTES
Arterial Line      Patient reassessed immediately prior to procedure    Start time: 12/14/2021 2:22 PM  Stop Time:12/14/2021 2:28 PM       Line placed for hemodynamic monitoring and ABGs/Labs/ISTAT.  Performed By   Anesthesiologist: Mushtaq Samuels MD  Preanesthetic Checklist  Completed: patient identified, IV checked, site marked, risks and benefits discussed, surgical consent, monitors and equipment checked, pre-op evaluation and timeout performed  Arterial Line Prep   Sterile Tech: cap, gloves, sterile barriers, gown and mask  Prep: ChloraPrep  Patient monitoring: EKG, continuous pulse oximetry and blood pressure monitoring  Arterial Line Procedure   Laterality:left  Location:  radial artery  Catheter size: 20 G   Guidance: palpation technique  Number of attempts: 1  Successful placement: yes  Post Assessment   Dressing Type: wrist guard applied, secured with tape and occlusive dressing applied.   Complications no  Circ/Move/Sens Assessment: normal and unchanged.   Patient Tolerance: patient tolerated the procedure well with no apparent complications  Additional Notes  Sterile seldinger technique, tolerated well

## 2021-12-14 NOTE — ANESTHESIA PROCEDURE NOTES
Central Line      Patient reassessed immediately prior to procedure    Staff  Anesthesiologist: Mushtaq Samuels MD  Preanesthetic Checklist  Completed: patient identified, IV checked, site marked, risks and benefits discussed, surgical consent, monitors and equipment checked, pre-op evaluation and timeout performed  Central Line Prep  Sterile Tech:gloves, cap, gown, mask and sterile barriers  Prep: chloraprep  Patient monitoring: blood pressure monitoring, continuous pulse oximetry and EKG  Central Line Procedure  Laterality:right  Location:internal jugular  Catheter Type:[place]  Catheter Size:9 Fr  Guidance:landmark technique and palpation technique  Assessment  Post procedure:biopatch applied, line sutured and occlusive dressing applied  Assessement:blood return through all ports, free fluid flow, chest x-ray ordered and Sascha Test  Complications:no  Patient Tolerance:patient tolerated the procedure well with no apparent complications  Additional Notes  Floated to 50 cm , no resistance no wedge

## 2021-12-14 NOTE — PLAN OF CARE
Goal Outcome Evaluation:      Pt admitted from [facility] in [place] for cardiac surgery evaluation.  Denies pain.  Continue heparin gtt per MD.  One episode of O2 desaturation with accompanying bradycardia- possible KRISTA.  Vital signs otherwise stable.  Continue to monitor

## 2021-12-14 NOTE — CASE MANAGEMENT/SOCIAL WORK
Discharge Planning Assessment   Nick     Patient Name: Saundra Holder  MRN: [id number]  Today's Date: 12/14/2021    Admit Date: 12/13/2021     Discharge Needs Assessment     Row Name 12/14/21 7239       Living Environment    Lives With spouse    Name(s) of Who Lives With Patient Adam    Current Living Arrangements home/apartment/condo    Primary Care Provided by self    Provides Primary Care For no one    Family Caregiver if Needed none    Able to Return to Prior Arrangements yes       Resource/Environmental Concerns    Transportation Concerns car, none       Transition Planning    Patient/Family Anticipates Transition to home    Patient/Family Anticipated Services at Transition none    Transportation Anticipated family or friend will provide       Discharge Needs Assessment    Readmission Within the Last 30 Days no previous admission in last 30 days    Equipment Currently Used at Home glucometer    Concerns to be Addressed discharge planning    Anticipated Changes Related to Illness none    Equipment Needed After Discharge none    Outpatient/Agency/Support Group Needs homecare agency    Provided Post Acute Provider List? Yes    Post Acute Provider List Home Health    Delivered To Patient    Method of Delivery In person               Discharge Plan     Row Name 12/14/21 9021       Plan    Plan Home with family/ possible HH pending clinical course    Plan Comments Spoke with patient at bedside. Confirmed pharmacy and pcp.  Home with family and /or Home health pending clinical course.Spouse will transport patient home.  Patient states she would like to use [facility] if needed              Continued Care and Services - Admitted Since 12/13/2021    Coordination has not been started for this encounter.       Expected Discharge Date and Time     Expected Discharge Date Expected Discharge Time    Dec 18, 2021          Demographic Summary     Row Name 12/14/21 1459       General Information    Admission Type  inpatient    Arrived From hospital    Referral Source admission list    Reason for Consult discharge planning    Preferred Language Greenlandic               Functional Status     Row Name 12/14/21 5203       Functional Status    Usual Activity Tolerance good    Current Activity Tolerance moderate       Functional Status, IADL    Medications independent    Meal Preparation independent    Housekeeping independent    Laundry independent    Shopping independent       Mental Status    General Appearance WDL WDL       Mental Status Summary    Recent Changes in Mental Status/Cognitive Functioning no changes                         Marci Sun, RN   Met with patient in room wearing PPE: mask, face shield/goggles, gloves, gown.      Maintained distance greater than six feet and spent less than 15 minutes in the room.

## 2021-12-15 ENCOUNTER — APPOINTMENT (OUTPATIENT)
Dept: GENERAL RADIOLOGY | Facility: HOSPITAL | Age: 51
End: 2021-12-15

## 2021-12-15 LAB
ANION GAP SERPL CALCULATED.3IONS-SCNC: 14 MMOL/L (ref 5–15)
APTT PPP: 26.8 SECONDS (ref 61–76.5)
ARTERIAL PATENCY WRIST A: ABNORMAL
ARTERIAL PATENCY WRIST A: ABNORMAL
ATMOSPHERIC PRESS: ABNORMAL MM[HG]
ATMOSPHERIC PRESS: ABNORMAL MM[HG]
BASE EXCESS BLDA CALC-SCNC: -3 MMOL/L (ref 0–3)
BASE EXCESS BLDA CALC-SCNC: -3.3 MMOL/L (ref 0–3)
BASOPHILS # BLD AUTO: 0 10*3/MM3 (ref 0–0.2)
BASOPHILS NFR BLD AUTO: 0.4 % (ref 0–1.5)
BDY SITE: ABNORMAL
BDY SITE: ABNORMAL
BUN SERPL-MCNC: 19 MG/DL (ref 6–20)
BUN/CREAT SERPL: 25.3 (ref 7–25)
CA-I BLDA-SCNC: 1.37 MMOL/L (ref 1.15–1.33)
CA-I SERPL ISE-MCNC: 1.38 MMOL/L (ref 1.2–1.3)
CALCIUM SPEC-SCNC: 9.5 MG/DL (ref 8.6–10.5)
CHLORIDE SERPL-SCNC: 111 MMOL/L (ref 98–107)
CO2 BLDA-SCNC: 23.3 MMOL/L (ref 22–29)
CO2 BLDA-SCNC: 23.8 MMOL/L (ref 22–29)
CO2 SERPL-SCNC: 20 MMOL/L (ref 22–29)
CREAT SERPL-MCNC: 0.75 MG/DL (ref 0.57–1)
DEPRECATED RDW RBC AUTO: 38.5 FL (ref 37–54)
EOSINOPHIL # BLD AUTO: 0.1 10*3/MM3 (ref 0–0.4)
EOSINOPHIL NFR BLD AUTO: 0.8 % (ref 0.3–6.2)
ERYTHROCYTE [DISTWIDTH] IN BLOOD BY AUTOMATED COUNT: 12.9 % (ref 12.3–15.4)
GFR SERPL CREATININE-BSD FRML MDRD: 82 ML/MIN/1.73
GLUCOSE BLDC GLUCOMTR-MCNC: 103 MG/DL (ref 70–105)
GLUCOSE BLDC GLUCOMTR-MCNC: 106 MG/DL (ref 70–105)
GLUCOSE BLDC GLUCOMTR-MCNC: 111 MG/DL (ref 70–105)
GLUCOSE BLDC GLUCOMTR-MCNC: 113 MG/DL (ref 70–105)
GLUCOSE BLDC GLUCOMTR-MCNC: 113 MG/DL (ref 74–100)
GLUCOSE BLDC GLUCOMTR-MCNC: 113 MG/DL (ref 74–100)
GLUCOSE BLDC GLUCOMTR-MCNC: 114 MG/DL (ref 70–105)
GLUCOSE BLDC GLUCOMTR-MCNC: 114 MG/DL (ref 70–105)
GLUCOSE BLDC GLUCOMTR-MCNC: 119 MG/DL (ref 70–105)
GLUCOSE BLDC GLUCOMTR-MCNC: 120 MG/DL (ref 70–105)
GLUCOSE BLDC GLUCOMTR-MCNC: 121 MG/DL (ref 70–105)
GLUCOSE BLDC GLUCOMTR-MCNC: 123 MG/DL (ref 70–105)
GLUCOSE BLDC GLUCOMTR-MCNC: 125 MG/DL (ref 74–100)
GLUCOSE BLDC GLUCOMTR-MCNC: 128 MG/DL (ref 70–105)
GLUCOSE BLDC GLUCOMTR-MCNC: 134 MG/DL (ref 70–105)
GLUCOSE BLDC GLUCOMTR-MCNC: 139 MG/DL (ref 70–105)
GLUCOSE BLDC GLUCOMTR-MCNC: 151 MG/DL (ref 70–105)
GLUCOSE BLDC GLUCOMTR-MCNC: 151 MG/DL (ref 70–105)
GLUCOSE BLDC GLUCOMTR-MCNC: 161 MG/DL (ref 70–105)
GLUCOSE BLDC GLUCOMTR-MCNC: 171 MG/DL (ref 70–105)
GLUCOSE SERPL-MCNC: 139 MG/DL (ref 65–99)
HCO3 BLDA-SCNC: 22.1 MMOL/L (ref 21–28)
HCO3 BLDA-SCNC: 22.5 MMOL/L (ref 21–28)
HCT VFR BLD AUTO: 28.6 % (ref 34–46.6)
HCT VFR BLDA CALC: 27 % (ref 38–51)
HEMODILUTION: NO
HEMODILUTION: NO
HGB BLD-MCNC: 9.8 G/DL (ref 12–15.9)
HGB BLDA-MCNC: 9.2 G/DL (ref 12–17)
INHALED O2 CONCENTRATION: 34 %
INHALED O2 CONCENTRATION: 40 %
INR PPP: 1.02 (ref 0.93–1.1)
LYMPHOCYTES # BLD AUTO: 0.8 10*3/MM3 (ref 0.7–3.1)
LYMPHOCYTES NFR BLD AUTO: 7.1 % (ref 19.6–45.3)
MCH RBC QN AUTO: 29.2 PG (ref 26.6–33)
MCHC RBC AUTO-ENTMCNC: 34.2 G/DL (ref 31.5–35.7)
MCV RBC AUTO: 85.4 FL (ref 79–97)
MODALITY: ABNORMAL
MODALITY: ABNORMAL
MONOCYTES # BLD AUTO: 0.7 10*3/MM3 (ref 0.1–0.9)
MONOCYTES NFR BLD AUTO: 6.8 % (ref 5–12)
NEUTROPHILS NFR BLD AUTO: 84.9 % (ref 42.7–76)
NEUTROPHILS NFR BLD AUTO: 9.2 10*3/MM3 (ref 1.7–7)
NRBC BLD AUTO-RTO: 0 /100 WBC (ref 0–0.2)
PCO2 BLDA: 40.5 MM HG (ref 35–48)
PCO2 BLDA: 41.2 MM HG (ref 35–48)
PEEP RESPIRATORY: 5 CM[H2O]
PH BLDA: 7.34 PH UNITS (ref 7.35–7.45)
PH BLDA: 7.34 PH UNITS (ref 7.35–7.45)
PLATELET # BLD AUTO: 260 10*3/MM3 (ref 140–450)
PMV BLD AUTO: 7.1 FL (ref 6–12)
PO2 BLDA: 101.9 MM HG (ref 83–108)
PO2 BLDA: 121.2 MM HG (ref 83–108)
POTASSIUM BLDA-SCNC: 4.1 MMOL/L (ref 3.5–4.5)
POTASSIUM SERPL-SCNC: 4.3 MMOL/L (ref 3.5–5.2)
PROTHROMBIN TIME: 11.3 SECONDS (ref 9.6–11.7)
PSV: 10 CMH2O
QT INTERVAL: 399 MS
QT INTERVAL: 404 MS
RBC # BLD AUTO: 3.34 10*6/MM3 (ref 3.77–5.28)
SAO2 % BLDCOA: 97.5 % (ref 94–98)
SAO2 % BLDCOA: 98.5 % (ref 94–98)
SODIUM BLD-SCNC: 143 MMOL/L (ref 138–146)
SODIUM SERPL-SCNC: 145 MMOL/L (ref 136–145)
VENTILATOR MODE: ABNORMAL
WBC NRBC COR # BLD: 10.8 10*3/MM3 (ref 3.4–10.8)

## 2021-12-15 PROCEDURE — 82330 ASSAY OF CALCIUM: CPT | Performed by: PHYSICIAN ASSISTANT

## 2021-12-15 PROCEDURE — 85610 PROTHROMBIN TIME: CPT | Performed by: PHYSICIAN ASSISTANT

## 2021-12-15 PROCEDURE — 93010 ELECTROCARDIOGRAM REPORT: CPT | Performed by: INTERNAL MEDICINE

## 2021-12-15 PROCEDURE — 85730 THROMBOPLASTIN TIME PARTIAL: CPT

## 2021-12-15 PROCEDURE — 25010000002 METOCLOPRAMIDE PER 10 MG: Performed by: THORACIC SURGERY (CARDIOTHORACIC VASCULAR SURGERY)

## 2021-12-15 PROCEDURE — 25010000002 ALBUMIN HUMAN 5% PER 50 ML: Performed by: PHYSICIAN ASSISTANT

## 2021-12-15 PROCEDURE — 25010000002 MAGNESIUM SULFATE IN D5W 1G/100ML (PREMIX) 1-5 GM/100ML-% SOLUTION: Performed by: THORACIC SURGERY (CARDIOTHORACIC VASCULAR SURGERY)

## 2021-12-15 PROCEDURE — 93005 ELECTROCARDIOGRAM TRACING: CPT | Performed by: PHYSICIAN ASSISTANT

## 2021-12-15 PROCEDURE — 25010000002 CEFAZOLIN PER 500 MG: Performed by: THORACIC SURGERY (CARDIOTHORACIC VASCULAR SURGERY)

## 2021-12-15 PROCEDURE — 94799 UNLISTED PULMONARY SVC/PX: CPT

## 2021-12-15 PROCEDURE — 82330 ASSAY OF CALCIUM: CPT

## 2021-12-15 PROCEDURE — 97116 GAIT TRAINING THERAPY: CPT

## 2021-12-15 PROCEDURE — 25010000002 MORPHINE PER 10 MG: Performed by: THORACIC SURGERY (CARDIOTHORACIC VASCULAR SURGERY)

## 2021-12-15 PROCEDURE — 85025 COMPLETE CBC W/AUTO DIFF WBC: CPT | Performed by: PHYSICIAN ASSISTANT

## 2021-12-15 PROCEDURE — 99024 POSTOP FOLLOW-UP VISIT: CPT | Performed by: THORACIC SURGERY (CARDIOTHORACIC VASCULAR SURGERY)

## 2021-12-15 PROCEDURE — 80048 BASIC METABOLIC PNL TOTAL CA: CPT | Performed by: THORACIC SURGERY (CARDIOTHORACIC VASCULAR SURGERY)

## 2021-12-15 PROCEDURE — 71045 X-RAY EXAM CHEST 1 VIEW: CPT

## 2021-12-15 PROCEDURE — 82962 GLUCOSE BLOOD TEST: CPT

## 2021-12-15 PROCEDURE — 97163 PT EVAL HIGH COMPLEX 45 MIN: CPT

## 2021-12-15 PROCEDURE — 82803 BLOOD GASES ANY COMBINATION: CPT

## 2021-12-15 PROCEDURE — 85018 HEMOGLOBIN: CPT

## 2021-12-15 PROCEDURE — 25010000002 FUROSEMIDE PER 20 MG: Performed by: THORACIC SURGERY (CARDIOTHORACIC VASCULAR SURGERY)

## 2021-12-15 PROCEDURE — 94003 VENT MGMT INPAT SUBQ DAY: CPT

## 2021-12-15 PROCEDURE — 25010000002 FUROSEMIDE PER 20 MG: Performed by: PHYSICIAN ASSISTANT

## 2021-12-15 PROCEDURE — 80051 ELECTROLYTE PANEL: CPT

## 2021-12-15 PROCEDURE — P9041 ALBUMIN (HUMAN),5%, 50ML: HCPCS | Performed by: PHYSICIAN ASSISTANT

## 2021-12-15 RX ORDER — ACETAMINOPHEN 650 MG/1
325 SUPPOSITORY RECTAL EVERY 4 HOURS PRN
Status: DISCONTINUED | OUTPATIENT
Start: 2021-12-15 | End: 2021-12-21 | Stop reason: HOSPADM

## 2021-12-15 RX ORDER — ACETAMINOPHEN 325 MG/1
650 TABLET ORAL EVERY 4 HOURS PRN
Status: DISCONTINUED | OUTPATIENT
Start: 2021-12-15 | End: 2021-12-21 | Stop reason: HOSPADM

## 2021-12-15 RX ORDER — ACETAMINOPHEN 160 MG/5ML
650 SOLUTION ORAL EVERY 4 HOURS PRN
Status: DISCONTINUED | OUTPATIENT
Start: 2021-12-15 | End: 2021-12-21 | Stop reason: HOSPADM

## 2021-12-15 RX ORDER — FUROSEMIDE 10 MG/ML
40 INJECTION INTRAMUSCULAR; INTRAVENOUS ONCE
Status: COMPLETED | OUTPATIENT
Start: 2021-12-15 | End: 2021-12-15

## 2021-12-15 RX ADMIN — ALBUMIN HUMAN 250 ML: 0.05 INJECTION, SOLUTION INTRAVENOUS at 05:25

## 2021-12-15 RX ADMIN — OXYCODONE 10 MG: 5 TABLET ORAL at 06:00

## 2021-12-15 RX ADMIN — ACETAMINOPHEN 650 MG: 325 TABLET, FILM COATED ORAL at 06:01

## 2021-12-15 RX ADMIN — DOCUSATE SODIUM 50 MG AND SENNOSIDES 8.6 MG 2 TABLET: 8.6; 5 TABLET, FILM COATED ORAL at 20:47

## 2021-12-15 RX ADMIN — CYCLOBENZAPRINE 10 MG: 10 TABLET, FILM COATED ORAL at 20:47

## 2021-12-15 RX ADMIN — CHLORHEXIDINE GLUCONATE 15 ML: 1.2 RINSE ORAL at 20:47

## 2021-12-15 RX ADMIN — FUROSEMIDE 40 MG: 10 INJECTION, SOLUTION INTRAMUSCULAR; INTRAVENOUS at 14:44

## 2021-12-15 RX ADMIN — ATORVASTATIN CALCIUM 40 MG: 40 TABLET, FILM COATED ORAL at 20:47

## 2021-12-15 RX ADMIN — SODIUM CHLORIDE 30 ML/HR: 9 INJECTION, SOLUTION INTRAVENOUS at 14:58

## 2021-12-15 RX ADMIN — CYCLOBENZAPRINE 10 MG: 10 TABLET, FILM COATED ORAL at 10:53

## 2021-12-15 RX ADMIN — Medication 10 ML: at 20:48

## 2021-12-15 RX ADMIN — MAGNESIUM SULFATE HEPTAHYDRATE 1 G: 1 INJECTION, SOLUTION INTRAVENOUS at 09:49

## 2021-12-15 RX ADMIN — WATER 2 G: 1000 INJECTION, SOLUTION INTRAVENOUS at 15:01

## 2021-12-15 RX ADMIN — OXYCODONE 10 MG: 5 TABLET ORAL at 20:47

## 2021-12-15 RX ADMIN — WATER 2 G: 1000 INJECTION, SOLUTION INTRAVENOUS at 09:50

## 2021-12-15 RX ADMIN — ASPIRIN 81 MG: 81 TABLET, COATED ORAL at 09:50

## 2021-12-15 RX ADMIN — METOCLOPRAMIDE 10 MG: 5 INJECTION, SOLUTION INTRAMUSCULAR; INTRAVENOUS at 06:00

## 2021-12-15 RX ADMIN — METOCLOPRAMIDE 10 MG: 5 INJECTION, SOLUTION INTRAMUSCULAR; INTRAVENOUS at 18:06

## 2021-12-15 RX ADMIN — WATER 2 G: 1000 INJECTION, SOLUTION INTRAVENOUS at 23:59

## 2021-12-15 RX ADMIN — HYDROCODONE BITARTRATE AND ACETAMINOPHEN 1 TABLET: 10; 325 TABLET ORAL at 13:41

## 2021-12-15 RX ADMIN — ACETAMINOPHEN ORAL SOLUTION 650 MG: 650 SOLUTION ORAL at 03:42

## 2021-12-15 RX ADMIN — HYDROCODONE BITARTRATE AND ACETAMINOPHEN 1 TABLET: 10; 325 TABLET ORAL at 18:06

## 2021-12-15 RX ADMIN — METOCLOPRAMIDE 10 MG: 5 INJECTION, SOLUTION INTRAMUSCULAR; INTRAVENOUS at 11:00

## 2021-12-15 RX ADMIN — MAGNESIUM SULFATE HEPTAHYDRATE 1 G: 1 INJECTION, SOLUTION INTRAVENOUS at 15:00

## 2021-12-15 RX ADMIN — PANTOPRAZOLE SODIUM 40 MG: 40 TABLET, DELAYED RELEASE ORAL at 06:00

## 2021-12-15 RX ADMIN — ALBUMIN HUMAN 250 ML: 0.05 INJECTION, SOLUTION INTRAVENOUS at 04:48

## 2021-12-15 RX ADMIN — MORPHINE SULFATE 1 MG: 2 INJECTION, SOLUTION INTRAMUSCULAR; INTRAVENOUS at 11:04

## 2021-12-15 RX ADMIN — OXYCODONE 10 MG: 5 TABLET ORAL at 01:59

## 2021-12-15 RX ADMIN — DOCUSATE SODIUM 50 MG AND SENNOSIDES 8.6 MG 2 TABLET: 8.6; 5 TABLET, FILM COATED ORAL at 09:50

## 2021-12-15 RX ADMIN — HYDROCODONE BITARTRATE AND ACETAMINOPHEN 1 TABLET: 5; 325 TABLET ORAL at 08:52

## 2021-12-15 RX ADMIN — CHLORHEXIDINE GLUCONATE 15 ML: 1.2 RINSE ORAL at 09:50

## 2021-12-15 RX ADMIN — FUROSEMIDE 40 MG: 10 INJECTION, SOLUTION INTRAMUSCULAR; INTRAVENOUS at 09:49

## 2021-12-15 NOTE — PROGRESS NOTES
" LOS: 2 days   Patient Care Team:  Treasure Dickinson APRN as PCP - General (Nurse Practitioner)    Chief Complaint: s/p CABG    Subjective  Complains of pain from the chest tubes.    Extubated this am at 0355  Drips: Levo 0.03, insulin  CI 2.8  CVP 15    Objective     Vital Signs  Temp:  [94.5 °F (34.7 °C)-100.8 °F (38.2 °C)] 99.5 °F (37.5 °C)  Heart Rate:  [65-89] 89  Resp:  [14-21] 14  BP: ()/() 121/67  Arterial Line BP: ()/(39-73) 112/55  FiO2 (%):  [40 %-100 %] 40 %  Body mass index is 34.08 kg/m².    Intake/Output Summary (Last 24 hours) at 12/15/2021 0849  Last data filed at 12/15/2021 0817  Gross per 24 hour   Intake 5809 ml   Output 3195 ml   Net 2614 ml     I/O this shift:  In: 46 [I.V.:46]  Out: 135 [Urine:85; Chest Tube:50]    Chest tube drainage last 8 hours: 150 cc    Wt Readings from Last 3 Encounters:   12/15/21 92.9 kg (204 lb 12.9 oz)       Flowsheet Rows      First Filed Value   Admission Height 165.1 cm (65\") Documented at 12/13/2021 1939   Admission Weight 93.1 kg (205 lb 4 oz) Documented at 12/13/2021 1939          Objective:  General Appearance:  Well-appearing, uncomfortable, in no acute distress and in pain (Sitting up in bed).    Vital signs: (most recent): Blood pressure 116/54, pulse 89, temperature 99.1 °F (37.3 °C), resp. rate 14, height 165.1 cm (65\"), weight 92.9 kg (204 lb 12.9 oz), SpO2 95 %, not currently breastfeeding.  Vital signs are normal.  (Tmax 100.8).    Output: Producing urine (+yanes).    Lungs:  Normal effort and normal respiratory rate.  There are decreased breath sounds (decreased bases).  (95% 2L)  Heart: Normal rate.  Regular rhythm.  (Paced at 90 (70s underlying))  Abdomen: Abdomen is soft.    Extremities: Normal range of motion.  There is no dependent edema.    Neurological: Patient is alert and oriented to person, place and time.    Skin:  Warm and dry.  (Dressings c/d/i)            Results Review:        Results from last 7 days   Lab Units " 12/15/21  0342 12/15/21  0338 12/14/21  2135 12/14/21  1905 12/14/21  1834 12/14/21  1514 12/14/21  0330   WBC 10*3/mm3 10.80  --   --   --  11.20*  --  12.20*   HEMOGLOBIN g/dL 9.8*  --   --   --  9.5*  --  12.4   HEMOGLOBIN, POC g/dL  --  9.2* 10.1*   < >  --    < >  --    HEMATOCRIT % 28.6*  --   --   --  28.4*  --  37.3   HEMATOCRIT POC %  --  27* 30*   < >  --    < >  --    PLATELETS 10*3/mm3 260  --   --   --  223  --  353    < > = values in this interval not displayed.         PT/INR:    Protime   Date Value Ref Range Status   12/15/2021 11.3 9.6 - 11.7 Seconds Final   12/14/2021 12.1 (H) 9.6 - 11.7 Seconds Final   12/14/2021 11.1 9.6 - 11.7 Seconds Final   12/13/2021 10.8 9.6 - 11.7 Seconds Final   /  INR   Date Value Ref Range Status   12/15/2021 1.02 0.93 - 1.10 Final   12/14/2021 1.10 0.93 - 1.10 Final   12/14/2021 1.00 0.93 - 1.10 Final   12/13/2021 0.97 0.93 - 1.10 Final       Results from last 7 days   Lab Units 12/14/21  2305 12/14/21 1834 12/14/21  1834 12/14/21  0330 12/14/21  0330   SODIUM mmol/L 142  --  142  --  139   POTASSIUM mmol/L 4.1  --  4.2  --  4.2   CHLORIDE mmol/L 107  --  108*  --  102   CO2 mmol/L 22.0  --  22.0  --  24.0   BUN mg/dL 18  --  17  --  20   CREATININE mg/dL 0.73  --  0.70  --  0.58   GLUCOSE mg/dL 129*   < > 132*   < > 144*   CALCIUM mg/dL 9.7  --  9.7  --  9.0    < > = values in this interval not displayed.         Scheduled Meds:  aspirin, 81 mg, Oral, Daily  atorvastatin, 40 mg, Oral, Nightly  ceFAZolin, 2 g, Intravenous, Q8H  chlorhexidine, 15 mL, Mouth/Throat, Q12H  [START ON 12/16/2021] enoxaparin, 40 mg, Subcutaneous, Daily  furosemide, 40 mg, Intravenous, Once  magnesium sulfate, 1 g, Intravenous, Q8H  metoclopramide, 10 mg, Intravenous, Q6H  mupirocin, , Each Nare, BID  pantoprazole, 40 mg, Oral, QAM  senna-docusate sodium, 2 tablet, Oral, BID  sodium chloride, 10 mL, Intravenous, Q12H        Infusions:  insulin, 0-50 Units/hr  norepinephrine, 0.02-0.3  mcg/kg/min, Last Rate: 0.02 mcg/kg/min (12/15/21 0300)  sodium chloride, 30 mL/hr, Last Rate: 30 mL/hr (12/14/21 1900)        Assessment & Plan     · Severe 3 vessel CAD---POD #1 s/p CABG x 4 (Pagni)  · NSTEMI  · Normal LV systolic function---EF 50-55%  · HTN  · HLD  · Non-insulin dependent DM   · Asthma  · Obesity---BMI 32.95      POD #1. Doing well. Discontinue [place]. Give Lasix 40 mg IV once. Wean Levophed as tolerated. Hold beta blocker for now for marginal BP. Routine care. Encourage IS.      GONZALEZ Pitts  12/15/21  08:49 EST

## 2021-12-15 NOTE — PLAN OF CARE
Goal Outcome Evaluation:  Plan of Care Reviewed With: patient           Outcome Summary: 50 yo female seen s/p cabg x 4 on 12/14/21. Hx of dm, htn, hld, asthma. Pt originally admitted on 12/13 for nstemi. 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 Her  has throat cancer, and he begins his first round of chemo tomorrow (12/16). Their son, who is 20, lives w/ them and works in the afternoon and evenings. And their other son who lives w/ them is 15 yo. Today, pt is extremely painful and hesitant to move her UEs. She required mod assist of 2 to come to sitting from supine. She comes to stand w/ min assist of 2, then ambulated 25 ft in room w/ rw and min assist of 2. Concern for home situation and possible high level of care for , w/ two younger sons as support system. Recommend IP rehab at d/c. Will follow.

## 2021-12-15 NOTE — PAYOR COMM NOTE
"This is additional clinical for Saundra Holder:    Saundra Holder (51 y.o. Female)             Date of Birth Social Security Number Address Home Phone MRN    [id number]  [id number] [place] [address] [phone] [id number]    Sabianist Marital Status             None        Admission Date Admission Type Admitting Provider Attending Provider Department, Room/Bed    12/13/21 Urgent Samuel Berger MD Pagni, Sebastian, MD [facility] CARDIOVASCULAR CARE UNIT, 2205/1    Discharge Date Discharge Disposition Discharge Destination                         Attending Provider: Samuel Berger MD    Allergies: Nitrofuran Derivatives    Isolation: None   Infection: None   Code Status: CPR   Advance Care Planning Activity    Ht: 165.1 cm (65\")   Wt: 92.9 kg (204 lb 12.9 oz)    Admission Cmt: None   Principal Problem: None                Active Insurance as of 12/13/2021     Primary Coverage     Payor Plan Insurance Group Employer/Plan Group    [employer] BLUE CROSS [facility] Net Transmit & Receive BLUE SHIELD PPO [id number]     Payor Plan Address Payor Plan Phone Number Payor Plan Fax Number Effective Dates    [address] [phone]  1/1/2021 - None Entered    [facility] [id number]       Subscriber Name Subscriber Birth Date Member ID       SAUNDRA HOLDER 1970 [id number]                 Emergency Contacts      (Rel.) Home Phone Work Phone Mobile Phone    ELISA HOLDER (Spouse) [phone] -- [phone]    Bairon Holder (Son) [phone] -- --    [place] (Brother) -- -- [phone]               History & Physical      Marci Gonzalez PA at 12/14/21 0900            Patient Care Team:  Treasure Dickinson APRN as PCP - General (Nurse Practitioner)    Chief complaint: Chest pain    Subjective     History of Present Illness:    Mrs. Holder is a 51 year-old female with PMH of HTN, HLD, and DM who presented to the ED in [place], [place] with a chief complaint of chest pain. She " describes the chest pain as a heaviness in the center of her chest that radiates to her left arm and is associated with shortness of breath. It first started several weeks ago, but has gotten progressively worse. She states it initially started with activity and improved with rest, but now even occurs at rest. In the ED her troponin was found to be elevated and she was ruled in for a NSTEMI. She then underwent cardiac catheterization which revealed EF 50-55% and severe 3 vessel CAD. Patient was subsequently transferred to [facility] last night for surgical evaluation. Patient is on a Heparin drip and currently denies chest pain.     Review of Systems   Constitutional: Positive for activity change and fatigue.   Respiratory: Positive for chest tightness and shortness of breath.    Cardiovascular: Positive for chest pain.        History reviewed. No pertinent past medical history.   HTN  HLD  DM  Asthma    History reviewed. No pertinent surgical history.     History reviewed. No pertinent family history.   Mother had heart disease    Social History     Tobacco Use   • Smoking status: Never Smoker   • Smokeless tobacco: Never Used   Substance Use Topics   • Alcohol use: Not on file   • Drug use: Not on file     Medications Prior to Admission   Medication Sig Dispense Refill Last Dose   • lisinopril (PRINIVIL,ZESTRIL) 10 MG tablet Take 15 mg by mouth Every 12 (Twelve) Hours.   12/13/2021 at Unknown time   • metFORMIN (GLUCOPHAGE) 500 MG tablet Take 500 mg by mouth Daily With Breakfast.   12/13/2021 at Unknown time   • metoprolol tartrate (LOPRESSOR) 25 MG tablet Take 12.5 mg by mouth 2 (Two) Times a Day As Needed.   12/14/2021 at Unknown time     aspirin, 81 mg, Oral, Daily  ceFAZolin, 2 g, Intravenous, Once  chlorhexidine, 15 mL, Mouth/Throat, Q12H  Chlorhexidine Gluconate Cloth, 1 application, Topical, Q12H  insulin lispro, 0-7 Units, Subcutaneous, TID AC  metoprolol tartrate, 12.5 mg, Oral, Q12H  [START ON  "12/15/2021] metoprolol tartrate, 12.5 mg, Oral, On Call to OR  mupirocin, 1 application, Each Nare, Q12H  sodium chloride, 10 mL, Intravenous, Q12H      Allergies:  Nitrofuran derivatives    Objective      Vital Signs  Temp:  [98.2 °F (36.8 °C)-98.5 °F (36.9 °C)] 98.4 °F (36.9 °C)  Heart Rate:  [61-83] 80  Resp:  [16-21] 16  BP: (104-175)/(50-97) 158/90    Flowsheet Rows      First Filed Value   Admission Height 165.1 cm (65\") Documented at 12/13/2021 1939   Admission Weight 93.1 kg (205 lb 4 oz) Documented at 12/13/2021 1939        165.1 cm (65\")    Physical Exam  Vitals and nursing note reviewed.   Constitutional:       General: She is not in acute distress.     Appearance: Normal appearance. She is obese. She is not ill-appearing or diaphoretic.   HENT:      Head: Normocephalic and atraumatic.      Nose: Nose normal.      Mouth/Throat:      Mouth: Mucous membranes are moist.      Pharynx: Oropharynx is clear.   Eyes:      Extraocular Movements: Extraocular movements intact.      Conjunctiva/sclera: Conjunctivae normal.      Pupils: Pupils are equal, round, and reactive to light.   Cardiovascular:      Rate and Rhythm: Normal rate and regular rhythm.      Heart sounds: No murmur heard.      Pulmonary:      Effort: Pulmonary effort is normal.      Breath sounds: Normal breath sounds.   Abdominal:      General: Bowel sounds are normal.      Palpations: Abdomen is soft.   Musculoskeletal:         General: Normal range of motion.      Cervical back: Normal range of motion and neck supple.      Right lower leg: No edema.      Left lower leg: No edema.   Skin:     General: Skin is warm and dry.   Neurological:      General: No focal deficit present.      Mental Status: She is alert and oriented to person, place, and time.   Psychiatric:         Mood and Affect: Mood normal.         Behavior: Behavior normal.         Results Review:   Lab Results (last 24 hours)     Procedure Component Value Units Date/Time    Lipid " Panel [[id number]] Collected: 12/14/21 0330    Specimen: Blood Updated: 12/14/21 0935    Hemoglobin A1c [[id number]] Collected: 12/14/21 0330    Specimen: Blood Updated: 12/14/21 0935    POC Glucose Once [[id number]]  (Abnormal) Collected: 12/14/21 0734    Specimen: Blood Updated: 12/14/21 0734     Glucose 137 mg/dL      Comment: Serial Number: [id number]:  [id number]       COVID PRE-OP / PRE-PROCEDURE SCREENING ORDER (NO ISOLATION) - Swab, Nasopharynx [[id number]]  (Normal) Collected: 12/14/21 0337    Specimen: Swab from Nasopharynx Updated: 12/14/21 0404    Narrative:      The following orders were created for panel order COVID PRE-OP / PRE-PROCEDURE SCREENING ORDER (NO ISOLATION) - Swab, Nasopharynx.  Procedure                               Abnormality         Status                     ---------                               -----------         ------                     COVID-19,CEPHEID/CHEYANNE,CO...[[id number]]  Normal              Final result                 Please view results for these tests on the individual orders.    COVID-19,CEPHEID/CHEYANNE,COR/GERA/PAD/LEANN IN-HOUSE(OR EMERGENT/ADD-ON),NP SWAB IN TRANSPORT MEDIA 3-4 HR TAT, RT-PCR - Swab, Nasopharynx [[id number]]  (Normal) Collected: 12/14/21 0337    Specimen: Swab from Nasopharynx Updated: 12/14/21 0404     COVID19 Not Detected    Narrative:      Fact sheet for providers: https://www.fda.gov/media/911998/download     Fact sheet for patients: https://www.fda.gov/media/009940/download  Fact sheet for providers: https://www.fda.gov/media/223606/download    Fact sheet for patients: https://www.fda.gov/media/922105/download    Test performed by PCR.    Basic Metabolic Panel [[id number]]  (Abnormal) Collected: 12/14/21 0330    Specimen: Blood Updated: 12/14/21 0403     Glucose 144 mg/dL      BUN 20 mg/dL      Creatinine 0.58 mg/dL      Sodium 139 mmol/L      Potassium 4.2 mmol/L      Chloride 102 mmol/L      CO2 24.0 mmol/L      Calcium 9.0 mg/dL      eGFR Non   Amer 110 mL/min/1.73      BUN/Creatinine Ratio 34.5     Anion Gap 13.0 mmol/L     Narrative:      GFR Normal >60  Chronic Kidney Disease <60  Kidney Failure <15      Hepatic Function Panel [[id number]]  (Abnormal) Collected: 12/14/21 0330    Specimen: Blood Updated: 12/14/21 0403     Total Protein 6.8 g/dL      Albumin 3.40 g/dL      ALT (SGPT) 43 U/L      AST (SGOT) 39 U/L      Alkaline Phosphatase 93 U/L      Total Bilirubin 0.4 mg/dL      Bilirubin, Direct 0.2 mg/dL      Bilirubin, Indirect 0.2 mg/dL     aPTT [[id number]]  (Abnormal) Collected: 12/14/21 0330    Specimen: Blood Updated: 12/14/21 0402     PTT 46.5 seconds     Protime-INR [[id number]]  (Normal) Collected: 12/14/21 0330    Specimen: Blood Updated: 12/14/21 0402     Protime 11.1 Seconds      INR 1.00    CBC & Differential [[id number]]  (Abnormal) Collected: 12/14/21 0330    Specimen: Blood Updated: 12/14/21 0346    Narrative:      The following orders were created for panel order CBC & Differential.  Procedure                               Abnormality         Status                     ---------                               -----------         ------                     CBC Auto Differential[[id number]]        Abnormal            Final result                 Please view results for these tests on the individual orders.    CBC Auto Differential [[id number]]  (Abnormal) Collected: 12/14/21 0330    Specimen: Blood Updated: 12/14/21 0346     WBC 12.20 10*3/mm3      RBC 4.39 10*6/mm3      Hemoglobin 12.4 g/dL      Hematocrit 37.3 %      MCV 84.9 fL      MCH 28.3 pg      MCHC 33.3 g/dL      RDW 13.4 %      RDW-SD 40.3 fl      MPV 6.9 fL      Platelets 353 10*3/mm3      Neutrophil % 63.3 %      Lymphocyte % 25.6 %      Monocyte % 6.6 %      Eosinophil % 3.3 %      Basophil % 1.2 %      Neutrophils, Absolute 7.70 10*3/mm3      Lymphocytes, Absolute 3.10 10*3/mm3      Monocytes, Absolute 0.80 10*3/mm3      Eosinophils, Absolute 0.40 10*3/mm3      Basophils,  Absolute 0.20 10*3/mm3      nRBC 0.1 /100 WBC     aPTT [[id number]]  (Abnormal) Collected: 12/13/21 2019    Specimen: Blood Updated: 12/13/21 2040     PTT 30.8 seconds     Protime-INR [[id number]]  (Normal) Collected: 12/13/21 2019    Specimen: Blood Updated: 12/13/21 2040     Protime 10.8 Seconds      INR 0.97    CBC & Differential [[id number]]  (Normal) Collected: 12/13/21 2019    Specimen: Blood Updated: 12/13/21 2026    Narrative:      The following orders were created for panel order CBC & Differential.  Procedure                               Abnormality         Status                     ---------                               -----------         ------                     CBC Auto Differential[[id number]]        Normal              Final result                 Please view results for these tests on the individual orders.    CBC Auto Differential [[id number]]  (Normal) Collected: 12/13/21 2019    Specimen: Blood Updated: 12/13/21 2026     WBC 9.90 10*3/mm3      RBC 4.26 10*6/mm3      Hemoglobin 12.0 g/dL      Hematocrit 35.6 %      MCV 83.6 fL      MCH 28.2 pg      MCHC 33.8 g/dL      RDW 13.2 %      RDW-SD 38.9 fl      MPV 6.8 fL      Platelets 341 10*3/mm3      Neutrophil % 68.2 %      Lymphocyte % 20.9 %      Monocyte % 6.7 %      Eosinophil % 2.9 %      Basophil % 1.3 %      Neutrophils, Absolute 6.80 10*3/mm3      Lymphocytes, Absolute 2.10 10*3/mm3      Monocytes, Absolute 0.70 10*3/mm3      Eosinophils, Absolute 0.30 10*3/mm3      Basophils, Absolute 0.10 10*3/mm3      nRBC 0.1 /100 WBC     POC Glucose Once [[id number]]  (Abnormal) Collected: 12/13/21 1921    Specimen: Blood Updated: 12/13/21 1922     Glucose 157 mg/dL      Comment: Serial Number: [id number]:  [id number]                   Assessment & Plan    · Severe 3 vessel CAD---on Heparin drip  · NSTEMI  · Normal LV systolic function---EF 50-55%  · HTN  · HLD  · Non-insulin dependent DM   · Asthma  · Obesity---BMI 32.95      Work-up ongoing for  likely CABG. Vein mapping and carotid duplex are pending. Dr. Berger to review films and make final recommendation, but tentative plan for surgery this afternoon. NPO. Discontinue Heparin drip on call to OR. Urine pregnancy test pending, but was negative at [place].        GONZALEZ Pitts  12/14/21  09:39 EST    Electronically signed by Marci Gonzalez PA at 12/14/21 1235          Operative/Procedure Notes (last 48 hours)      Samuel Berger MD at 12/14/21 1522        Operative Note    Date of Dictation: 12/14/21    Date of Procedure: Same    Referring Physician: Treasure Dickinson APRN    Preoperative diagnosis:   1.  Severe three-vessel coronary artery disease  2.  Non-ST elevation MI  3.  Angina pectoris    Postoperative diagnosis:   Same    Procedure:   1.  Urgent CABG x fall with a LIMA to the mid LAD and reverse individual saphenous vein graft to the PDA, and OM 1 and 2  2. EVH of the right legs    Surgeon: Samuel Berger MD     Assistants: GONZALEZ Candelaria and Luanne Romero M.D. Assistant: Connor Solitario PA-C was responsible for performing the following activities: Retraction, Suction, Irrigation, Suturing, Closing, Placing Dressing, Harvesting of Vessels, Bypass Grafting and All aspects of the cardiac case and their skilled assistance was necessary for the success of this case.    Anesthesia: General endotracheal anesthesia and ANJEL    Findings:  The saphenous vein was harvested endoscopically form the right  leg. The vein had a diameter of 4 mm and was of fair quality. The coronaries had diameters between 1.5 and 1.75 mm.    Estimated Blood Loss: Approximately 400 cc, most of it recorded with a Cell Saver device and cardiotomy suckers I was retransfused to the patient    STS Data:    The patient was explained the risks (STS risk score calculated), benefits and alternatives of surgery and agreed to proceed. The antibiotics and b blockers were given in the STS required window.   Consent was given about diet, alcohol and tobacco use as needed        Description of the procedure:     The patient was placed supine on the operative table. General anesthesia was given and lines placed. The patient was prepped and draped using the usual sterile technique. A median sternotomy was performed with a scalpel and the layers carried down to the sternum using the electrocautery. The sternum was split in the midline using a vertical oscillating saw. Hemostasis was achieved. The LIMA was harvested as a pedicle and prepared with papaverine. It was of good quality. 300 units/kg of IV heparin was given to an ACT over 400. A Favaloro retractor was placed and the mediastinum exposed, the pericardium was opened and the edges tacked to the wound. Cannulation sutures were placed in the ascending aorta and right atrium. Small cannulas were placed and aorto right atrial cardiopulmonary bypass was started. Cardioplegia cannulas were placed and then the ascending aorta was clamped. One liter of cold blood cardioplegia was given in an antegrade fashion to achieved diastolic arrest and further doses every 15 minutes thereafter. Constructions of grafts were done in the sequence distal - proximal between the reversedsaphenousveingraft and each coronary targets. Grafts were constructed to the PDA, OM1 and OM2 coronary arteries and plegia was given between graft sequences after de-airing the aortic root and tying the proximal anastomosis. The LIMA was anastomosed to the mid LAD using 7.0 prolene continuous suture with a small needle, then the warm dose of cardioplegia was given and then the aortic clamp removed as well as the LIMA bulldog clamp. All anastomoses were checked and had good flow and morphology. The left pleural space was suctioned and the lungs ventilated. The heart was paced till regular atrial rhythm resumed. I allowed the heart to eject and once hemodynamics were acceptable, then the CPB was discontinued and  the venous and cardioplegia cannulas removed. The matching dose of protamine was given and the aortic cannula removed as well. AV temporary wires and pleural and mediastinal chest tubes were placed and the wound sprayed with platelet rich plasma.  The sternum was closed with single and double wires and soft tissue in layers of reabsorbable material. The wounds were covered with sterile dressings.       Specimen removed: None    CPB time: 60 minutes    Aortic clamp time: 48 minutes    Complications:  none           Disposition: Cardiovascular recovery room           Condition: Critical but stable.      Electronically signed by Samuel Berger MD at 12/14/21 1826         Physician Progress Notes (last 48 hours)  Notes from 12/13/21 0950 through 12/15/21 0950   No notes of this type exist for this encounter.       Consult Notes (last 48 hours)  Notes from 12/13/21 0950 through 12/15/21 0950   No notes of this type exist for this encounter.

## 2021-12-15 NOTE — THERAPY EVALUATION
Patient Name: Saundra Holder  : 1970    MRN: [id number]                              Today's Date: 12/15/2021       Admit Date: 2021    Visit Dx:     ICD-10-CM ICD-9-CM   1. CAD, multiple vessel  I25.10 414.00     Patient Active Problem List   Diagnosis   • CAD, multiple vessel     History reviewed. No pertinent past medical history.  Past Surgical History:   Procedure Laterality Date   • CORONARY ARTERY BYPASS GRAFT N/A 2021    Procedure: CORONARY ARTERY BYPASS GRAFTING;  Surgeon: Samuel Berger MD;  Location: 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  -CM     Row Name 12/15/21 1703          Home Main Entrance    Number of Stairs, Main Entrance five  -CM     Stair Railings, Main Entrance railings safe and in good condition  -     Row Name 12/15/21 1703          Stairs Within Home, Primary    Stairs, Within Home, Primary can stay in bedroom on first floor if needed; otherwise goes up 3 stairs to bedroom  -     Number of Stairs, Within Home, Primary three  -CM     Stair Railings, Within Home, Primary railings safe and in good condition  -     Row Name 12/15/21 1703           Cognition    Orientation Status (Cognition) oriented x 4; other (see comments)  needed multiple cues to open eyes; very painful  -CM     Row Name 12/15/21 1703          Safety Issues, Functional Mobility    Impairments Affecting Function (Mobility) endurance/activity tolerance; pain; strength; range of motion (ROM); shortness of breath; grasp  -CM     Comment, Safety Issues/Impairments (Mobility) very hesitant to move UEs at all  -CM           User Key  (r) = Recorded By, (t) = Taken By, (c) = Cosigned By    Initials Name Provider Type    Marcy Clark, PT Physical Therapist               Mobility     Row Name 12/15/21 1707          Bed Mobility    Bed Mobility supine-sit  -CM     Supine-Sit [place] (Bed Mobility) moderate assist (50% patient effort); 2 person assist  -CM     Assistive Device (Bed Mobility) draw sheet; head of bed elevated  -CM     Row Name 12/15/21 1707          Bed-Chair Transfer    Bed-Chair [place] (Transfers) not tested  -CM     Row Name 12/15/21 1707          Sit-Stand Transfer    Sit-Stand [place] (Transfers) minimum assist (75% patient effort); moderate assist (50% patient effort); 1 person assist; 1 person to manage equipment  -CM     Assistive Device (Sit-Stand Transfers) walker, front-wheeled  -CM     Row Name 12/15/21 1707          Gait/Stairs (Locomotion)    [place] Level (Gait) minimum assist (75% patient effort); 1 person assist; 1 person to manage equipment  -CM     Assistive Device (Gait) walker, front-wheeled  -CM     Distance in Feet (Gait) 25 ft in room; needed multiple cues for eyes open and for proper breathing  -CM     Deviations/Abnormal Patterns (Gait) antalgic; gait speed decreased; stride length decreased  -CM     Row Name 12/15/21 1707          Mobility    Extremity Weight-bearing Status left upper extremity; right upper extremity  -CM     Left Upper Extremity (Weight-bearing Status) touch down weight-bearing (TDWB)  -CM     Right Upper  Extremity (Weight-bearing Status) touch down weight-bearing (TDWB)  -CM           User Key  (r) = Recorded By, (t) = Taken By, (c) = Cosigned By    Initials Name Provider Type    Marcy Clark, PT Physical Therapist               Obj/Interventions     Row Name 12/15/21 1708          Range of Motion Comprehensive    General Range of Motion bilateral lower extremity ROM WFL  -CM     Row Name 12/15/21 1708          Strength Comprehensive (MMT)    General Manual Muscle Testing (MMT) Assessment lower extremity strength deficits identified  -CM     Comment, General Manual Muscle Testing (MMT) Assessment BLEs 4-/5  -CM     Row Name 12/15/21 1708          Balance    Balance Assessment sitting static balance; sitting dynamic balance; standing static balance; standing dynamic balance  -CM     Static Sitting Balance WFL; unsupported; sitting, edge of bed  -CM     Dynamic Sitting Balance mild impairment; unsupported; sitting, edge of bed  -CM     Static Standing Balance mild impairment; supported; standing  -CM     Dynamic Standing Balance mild impairment  -CM     Row Name 12/15/21 1708          Sensory Assessment (Somatosensory)    Sensory Assessment (Somatosensory) sensation intact  -CM           User Key  (r) = Recorded By, (t) = Taken By, (c) = Cosigned By    Initials Name Provider Type    Marcy Clark, PT Physical Therapist               Goals/Plan     Row Name 12/15/21 1716          Bed Mobility Goal 1 (PT)    Activity/Assistive Device (Bed Mobility Goal 1, PT) bed mobility activities, all  -CM     [place] Level/Cues Needed (Bed Mobility Goal 1, PT) modified independence  -CM     Time Frame (Bed Mobility Goal 1, PT) 2 weeks  -CM     Row Name 12/15/21 1716          Transfer Goal 1 (PT)    Activity/Assistive Device (Transfer Goal 1, PT) transfers, all  -CM     [place] Level/Cues Needed (Transfer Goal 1, PT) independent  -CM     Time Frame (Transfer Goal 1, PT) 2 weeks  -CM     Row Name 12/15/21 1716           Gait Training Goal 1 (PT)    Activity/Assistive Device (Gait Training Goal 1, PT) gait (walking locomotion)  -CM     [place] Level (Gait Training Goal 1, PT) contact guard assist; standby assist  -CM     Distance (Gait Training Goal 1, PT) 80 ft  -CM     Time Frame (Gait Training Goal 1, PT) 2 weeks  -CM           User Key  (r) = Recorded By, (t) = Taken By, (c) = Cosigned By    Initials Name Provider Type    Marcy Clark, PT Physical Therapist               Clinical Impression     Row Name 12/15/21 6139          Pain    Additional Documentation Pain Scale: Numbers Pre/Post-Treatment (Group)  -CM     Row Name 12/15/21 1708          Pain Scale: Numbers Pre/Post-Treatment    Pretreatment Pain Rating 8/10  -CM     Posttreatment Pain Rating 8/10  -CM     Pain Location - Orientation incisional  -CM     Pain Location chest; abdomen  -CM     Pre/Posttreatment Pain Comment had pain meds prior to PT  -CM     Pain Intervention(s) Medication (See MAR); Repositioned; Emotional support; Back rub; Ambulation/increased activity  -CM     Row Name 12/15/21 0070          Plan of Care Review    Plan of Care Reviewed With patient  -CM     Outcome Summary 50 yo female seen s/p cabg x 4 on 12/14/21. Hx of dm, htn, hld, asthma. Pt originally admitted on 12/13 for nstemi. 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 Her  has throat cancer, and he begins his first round of chemo tomorrow (12/16). Their son, who is 20, lives w/ them and works in the afternoon and evenings. And their other son who lives w/ them is 13 yo. Today, pt is extremely painful and hesitant to move her UEs. She required mod assist of 2 to come to sitting from supine. She comes to stand w/ min assist of 2, then ambulated 25 ft in room w/ rw and min assist of 2. Concern for home situation and possible high level of care for , w/ two younger sons as support system. Recommend IP rehab at d/c. Will follow.  -CM     Row  Name 12/15/21 1709          Therapy Assessment/Plan (PT)    Rehab Potential (PT) good, to achieve stated therapy goals  -CM     Criteria for Skilled Interventions Met (PT) yes; meets criteria; skilled treatment is necessary  -CM     Predicted Duration of Therapy Intervention (PT) until d/c  -CM     Row Name 12/15/21 1714          Vital Signs    Pre Systolic BP Rehab 112  supine  -CM     Pre Treatment Diastolic BP 47  map 57  -CM     Intra Systolic BP Rehab 121  standing  -CM     Intra Treatment Diastolic BP 52  map 61  -CM     Post Systolic BP Rehab 120  after amb  -CM     Post Treatment Diastolic BP 50  map 73  -CM     Pretreatment Heart Rate (beats/min) 89  -CM     Posttreatment Heart Rate (beats/min) 87  -CM     Pretreatment Resp Rate (breaths/min) 22  -CM     Posttreatment Resp Rate (breaths/min) 31  -CM     Pre SpO2 (%) 95  -CM     O2 Delivery Pre Treatment supplemental O2  2L  -CM     O2 Delivery Intra Treatment supplemental O2  -CM     Post SpO2 (%) 94  -CM     O2 Delivery Post Treatment supplemental O2  -CM     Row Name 12/15/21 1714          Positioning and Restraints    Pre-Treatment Position in bed  -CM     Post Treatment Position chair  -CM     In Chair notified nsg; sitting; call light within reach; encouraged to call for assist; exit alarm on; with nsg  -CM           User Key  (r) = Recorded By, (t) = Taken By, (c) = Cosigned By    Initials Name Provider Type    Marcy Clark, PT Physical Therapist               Outcome Measures     Row Name 12/15/21 1719          How much help from another person do you currently need...    Turning from your back to your side while in flat bed without using bedrails? 2  -CM     Moving from lying on back to sitting on the side of a flat bed without bedrails? 2  -CM     Moving to and from a bed to a chair (including a wheelchair)? 2  -CM     Standing up from a chair using your arms (e.g., wheelchair, bedside chair)? 3  -CM     Climbing 3-5 steps with a railing? 2   -CM     To walk in hospital room? 2  -CM     AM-PAC 6 Clicks Score (PT) 13  -CM     Row Name 12/15/21 1717          Functional Assessment    Outcome Measure Options AM-PAC 6 Clicks Basic Mobility (PT)  -CM           User Key  (r) = Recorded By, (t) = Taken By, (c) = Cosigned By    Initials Name Provider Type    CM Marcy Tolentino, PT Physical Therapist                             Physical Therapy Education                 Title: PT OT SLP Therapies (Done)     Topic: Physical Therapy (Done)     Point: Mobility training (Done)     Learning Progress Summary           Patient Acceptance, E,TB, VU,NR by CAMILA at 12/15/2021 1718                   Point: Precautions (Done)     Learning Progress Summary           Patient Acceptance, E,TB, VU,NR by  at 12/15/2021 1718                               User Key     Initials Effective Dates Name Provider Type Discipline     06/16/21 -  Marcy Tolentino, PT Physical Therapist PT              PT Recommendation and Plan  Planned Therapy Interventions (PT): balance training, bed mobility training, gait training, patient/family education, strengthening, ROM (range of motion), postural re-education, transfer training  Plan of Care Reviewed With: patient  Outcome Summary: 50 yo female seen s/p cabg x 4 on 12/14/21. Hx of dm, htn, hld, asthma. Pt originally admitted on 12/13 for nstemi. 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 Her  has throat cancer, and he begins his first round of chemo tomorrow (12/16). Their son, who is 20, lives w/ them and works in the afternoon and evenings. And their other son who lives w/ them is 13 yo. Today, pt is extremely painful and hesitant to move her UEs. She required mod assist of 2 to come to sitting from supine. She comes to stand w/ min assist of 2, then ambulated 25 ft in room w/ rw and min assist of 2. Concern for home situation and possible high level of care for , w/ two younger sons as support system.  Recommend IP rehab at d/c. Will follow.     Time Calculation:    PT Charges     Row Name 12/15/21 1719 12/15/21 1718 12/15/21 1018       Time Calculation    Start Time -- 1534  -CM --    Stop Time -- 1615  -CM --    Time Calculation (min) -- 41 min  -CM --    PT Received On -- 12/15/21  -CM --    PT - Next Appointment -- 12/17/21  -CM 12/16/21  -CM    PT Goal Re-Cert Due Date 12/29/21  -CM -- --       Time Calculation- PT    Total Timed Code Minutes- PT -- 8 minute(s)  -CM --          User Key  (r) = Recorded By, (t) = Taken By, (c) = Cosigned By    Initials Name Provider Type    Marcy Clark, PT Physical Therapist              Therapy Charges for Today     Code Description Service Date Service Provider Modifiers Qty    58426754572 HC PT EVAL HIGH COMPLEXITY 4 12/15/2021 Marcy Tolentino, PT GP 1    52368915169 HC GAIT TRAINING EA 15 MIN 12/15/2021 Marcy Tolentino, PT GP 1          PT G-Codes  Outcome Measure Options: AM-PAC 6 Clicks Basic Mobility (PT)  AM-PAC 6 Clicks Score (PT): 13    Marcy Tolentino PT  12/15/2021

## 2021-12-15 NOTE — PLAN OF CARE
Problem: Adult Inpatient Plan of Care  Goal: Plan of Care Review  Outcome: Ongoing, Progressing  Flowsheets (Taken 12/15/2021 6390)  Progress: improving  Plan of Care Reviewed With: patient

## 2021-12-15 NOTE — SIGNIFICANT NOTE
12/15/21 1018   OTHER   Discipline physical therapist   Rehab Time/Intention   Session Not Performed unable to evaluate, medical status change  (RN refused rx this AM; pt too hypotensive)   Recommendation   PT - Next Appointment 12/16/21

## 2021-12-16 ENCOUNTER — APPOINTMENT (OUTPATIENT)
Dept: GENERAL RADIOLOGY | Facility: HOSPITAL | Age: 51
End: 2021-12-16

## 2021-12-16 LAB
ANION GAP SERPL CALCULATED.3IONS-SCNC: 14 MMOL/L (ref 5–15)
BASE DEFICIT: ABNORMAL
BASE EXCESS BLDA CALC-SCNC: 0 MMOL/L (ref 0–3)
BASE EXCESS BLDA CALC-SCNC: 1 MMOL/L (ref 0–3)
BASE EXCESS BLDA CALC-SCNC: <0 MMOL/L (ref 0–3)
BASE EXCESS BLDV CALC-SCNC: ABNORMAL MMOL/L
BUN SERPL-MCNC: 21 MG/DL (ref 6–20)
BUN/CREAT SERPL: 26.6 (ref 7–25)
CA-I BLDA-SCNC: 1.14 MMOL/L (ref 1.12–1.32)
CA-I BLDA-SCNC: 1.2 MMOL/L (ref 1.12–1.32)
CA-I BLDA-SCNC: 1.2 MMOL/L (ref 1.12–1.32)
CA-I BLDA-SCNC: 1.49 MMOL/L (ref 1.12–1.32)
CALCIUM SPEC-SCNC: 9.4 MG/DL (ref 8.6–10.5)
CHLORIDE SERPL-SCNC: 104 MMOL/L (ref 98–107)
CO2 BLDA-SCNC: 24 MMOL/L (ref 23–27)
CO2 BLDA-SCNC: 28 MMOL/L (ref 23–27)
CO2 BLDA-SCNC: 29 MMOL/L (ref 23–27)
CO2 CONTENT VENOUS: ABNORMAL
CO2 SERPL-SCNC: 22 MMOL/L (ref 22–29)
CREAT SERPL-MCNC: 0.79 MG/DL (ref 0.57–1)
DEPRECATED RDW RBC AUTO: 40.7 FL (ref 37–54)
ERYTHROCYTE [DISTWIDTH] IN BLOOD BY AUTOMATED COUNT: 13.3 % (ref 12.3–15.4)
GFR SERPL CREATININE-BSD FRML MDRD: 77 ML/MIN/1.73
GLUCOSE BLDC GLUCOMTR-MCNC: 103 MG/DL (ref 70–105)
GLUCOSE BLDC GLUCOMTR-MCNC: 105 MG/DL (ref 70–105)
GLUCOSE BLDC GLUCOMTR-MCNC: 112 MG/DL (ref 70–105)
GLUCOSE BLDC GLUCOMTR-MCNC: 152 MG/DL (ref 70–105)
GLUCOSE BLDC GLUCOMTR-MCNC: 156 MG/DL (ref 70–105)
GLUCOSE BLDC GLUCOMTR-MCNC: 158 MG/DL (ref 70–105)
GLUCOSE BLDC GLUCOMTR-MCNC: 164 MG/DL (ref 70–105)
GLUCOSE BLDC GLUCOMTR-MCNC: 165 MG/DL (ref 70–105)
GLUCOSE BLDC GLUCOMTR-MCNC: 193 MG/DL (ref 70–105)
GLUCOSE BLDC GLUCOMTR-MCNC: 218 MG/DL (ref 70–105)
GLUCOSE BLDC GLUCOMTR-MCNC: 95 MG/DL (ref 70–105)
GLUCOSE SERPL-MCNC: 95 MG/DL (ref 65–99)
HCO3 BLDA-SCNC: 22.5 MMOL/L (ref 22–26)
HCO3 BLDA-SCNC: 26.8 MMOL/L (ref 22–26)
HCO3 BLDA-SCNC: 27.4 MMOL/L (ref 22–26)
HCO3 BLDV-SCNC: 24.3 MMOL/L (ref 23–28)
HCT VFR BLD AUTO: 25.9 % (ref 34–46.6)
HCT VFR BLDA CALC: 24 % (ref 38–51)
HCT VFR BLDA CALC: 25 % (ref 38–51)
HCT VFR BLDA CALC: 25 % (ref 38–51)
HCT VFR BLDA CALC: 26 % (ref 38–51)
HGB BLD-MCNC: 8.7 G/DL (ref 12–15.9)
HGB BLDA-MCNC: 8.2 G/DL (ref 12–17)
HGB BLDA-MCNC: 8.5 G/DL (ref 12–17)
HGB BLDA-MCNC: 8.5 G/DL (ref 12–17)
HGB BLDA-MCNC: 8.8 G/DL (ref 12–17)
MCH RBC QN AUTO: 28.8 PG (ref 26.6–33)
MCHC RBC AUTO-ENTMCNC: 33.4 G/DL (ref 31.5–35.7)
MCV RBC AUTO: 86.2 FL (ref 79–97)
PCO2 BLDA: 43.5 MM HG (ref 35–45)
PCO2 BLDA: 54.5 MM HG (ref 35–45)
PCO2 BLDA: 56.9 MM HG (ref 35–45)
PCO2 BLDV: 52 MM HG (ref 41–51)
PH BLDA: 7.29 PH UNITS (ref 7.35–7.45)
PH BLDA: 7.3 PH UNITS (ref 7.35–7.45)
PH BLDA: 7.32 PH UNITS (ref 7.35–7.45)
PH BLDV: 7.28 PH UNITS (ref 7.31–7.41)
PLATELET # BLD AUTO: 228 10*3/MM3 (ref 140–450)
PMV BLD AUTO: 7.1 FL (ref 6–12)
PO2 BLDA: 134 MM HG (ref 80–105)
PO2 BLDA: 488 MM HG (ref 80–105)
PO2 BLDA: 498 MM HG (ref 80–105)
PO2 BLDV: 47 MM HG (ref 35–42)
POTASSIUM BLDA-SCNC: 4.1 MMOL/L (ref 3.5–4.9)
POTASSIUM BLDA-SCNC: 4.2 MMOL/L (ref 3.5–4.9)
POTASSIUM BLDA-SCNC: 4.4 MMOL/L (ref 3.5–4.9)
POTASSIUM BLDA-SCNC: 4.6 MMOL/L (ref 3.5–4.9)
POTASSIUM SERPL-SCNC: 3.8 MMOL/L (ref 3.5–5.2)
QT INTERVAL: 384 MS
RBC # BLD AUTO: 3.01 10*6/MM3 (ref 3.77–5.28)
SAO2 % BLDCOA: 100 % (ref 95–98)
SAO2 % BLDCOA: 100 % (ref 95–98)
SAO2 % BLDCOA: 99 % (ref 95–98)
SAO2 % BLDCOV: 26 % (ref 95–99)
SODIUM BLD-SCNC: 138 MMOL/L (ref 138–146)
SODIUM BLD-SCNC: 139 MMOL/L (ref 138–146)
SODIUM SERPL-SCNC: 140 MMOL/L (ref 136–145)
WBC NRBC COR # BLD: 11 10*3/MM3 (ref 3.4–10.8)

## 2021-12-16 PROCEDURE — 97116 GAIT TRAINING THERAPY: CPT

## 2021-12-16 PROCEDURE — 25010000002 ONDANSETRON PER 1 MG: Performed by: THORACIC SURGERY (CARDIOTHORACIC VASCULAR SURGERY)

## 2021-12-16 PROCEDURE — 71045 X-RAY EXAM CHEST 1 VIEW: CPT

## 2021-12-16 PROCEDURE — 25010000002 CEFAZOLIN PER 500 MG: Performed by: THORACIC SURGERY (CARDIOTHORACIC VASCULAR SURGERY)

## 2021-12-16 PROCEDURE — 82962 GLUCOSE BLOOD TEST: CPT

## 2021-12-16 PROCEDURE — 25010000002 ENOXAPARIN PER 10 MG: Performed by: NURSE PRACTITIONER

## 2021-12-16 PROCEDURE — 94799 UNLISTED PULMONARY SVC/PX: CPT

## 2021-12-16 PROCEDURE — 97166 OT EVAL MOD COMPLEX 45 MIN: CPT

## 2021-12-16 PROCEDURE — 99024 POSTOP FOLLOW-UP VISIT: CPT | Performed by: THORACIC SURGERY (CARDIOTHORACIC VASCULAR SURGERY)

## 2021-12-16 PROCEDURE — 80048 BASIC METABOLIC PNL TOTAL CA: CPT | Performed by: THORACIC SURGERY (CARDIOTHORACIC VASCULAR SURGERY)

## 2021-12-16 PROCEDURE — 97535 SELF CARE MNGMENT TRAINING: CPT

## 2021-12-16 PROCEDURE — 63710000001 INSULIN LISPRO (HUMAN) PER 5 UNITS: Performed by: NURSE PRACTITIONER

## 2021-12-16 PROCEDURE — 85027 COMPLETE CBC AUTOMATED: CPT | Performed by: THORACIC SURGERY (CARDIOTHORACIC VASCULAR SURGERY)

## 2021-12-16 RX ORDER — DEXTROSE MONOHYDRATE 25 G/50ML
25 INJECTION, SOLUTION INTRAVENOUS
Status: DISCONTINUED | OUTPATIENT
Start: 2021-12-16 | End: 2021-12-21 | Stop reason: HOSPADM

## 2021-12-16 RX ORDER — POTASSIUM CHLORIDE 20 MEQ/1
40 TABLET, EXTENDED RELEASE ORAL EVERY 4 HOURS
Status: COMPLETED | OUTPATIENT
Start: 2021-12-16 | End: 2021-12-16

## 2021-12-16 RX ORDER — FUROSEMIDE 20 MG/1
20 TABLET ORAL DAILY
Status: DISCONTINUED | OUTPATIENT
Start: 2021-12-16 | End: 2021-12-21 | Stop reason: HOSPADM

## 2021-12-16 RX ORDER — INSULIN LISPRO 100 [IU]/ML
0-9 INJECTION, SOLUTION INTRAVENOUS; SUBCUTANEOUS AS NEEDED
Status: DISCONTINUED | OUTPATIENT
Start: 2021-12-16 | End: 2021-12-21 | Stop reason: HOSPADM

## 2021-12-16 RX ORDER — NICOTINE POLACRILEX 4 MG
15 LOZENGE BUCCAL
Status: DISCONTINUED | OUTPATIENT
Start: 2021-12-16 | End: 2021-12-21 | Stop reason: HOSPADM

## 2021-12-16 RX ORDER — INSULIN LISPRO 100 [IU]/ML
0-9 INJECTION, SOLUTION INTRAVENOUS; SUBCUTANEOUS
Status: DISCONTINUED | OUTPATIENT
Start: 2021-12-16 | End: 2021-12-21 | Stop reason: HOSPADM

## 2021-12-16 RX ORDER — OLANZAPINE 10 MG/2ML
1 INJECTION, POWDER, LYOPHILIZED, FOR SOLUTION INTRAMUSCULAR
Status: DISCONTINUED | OUTPATIENT
Start: 2021-12-16 | End: 2021-12-21 | Stop reason: HOSPADM

## 2021-12-16 RX ADMIN — PANTOPRAZOLE SODIUM 40 MG: 40 TABLET, DELAYED RELEASE ORAL at 06:05

## 2021-12-16 RX ADMIN — HYDROCODONE BITARTRATE AND ACETAMINOPHEN 1 TABLET: 5; 325 TABLET ORAL at 22:01

## 2021-12-16 RX ADMIN — WATER 2 G: 1000 INJECTION, SOLUTION INTRAVENOUS at 17:01

## 2021-12-16 RX ADMIN — FUROSEMIDE 20 MG: 20 TABLET ORAL at 12:41

## 2021-12-16 RX ADMIN — INSULIN LISPRO 2 UNITS: 100 INJECTION, SOLUTION INTRAVENOUS; SUBCUTANEOUS at 17:01

## 2021-12-16 RX ADMIN — ENOXAPARIN SODIUM 40 MG: 40 INJECTION SUBCUTANEOUS at 17:11

## 2021-12-16 RX ADMIN — ATORVASTATIN CALCIUM 40 MG: 40 TABLET, FILM COATED ORAL at 20:20

## 2021-12-16 RX ADMIN — WATER 2 G: 1000 INJECTION, SOLUTION INTRAVENOUS at 09:12

## 2021-12-16 RX ADMIN — INSULIN LISPRO 4 UNITS: 100 INJECTION, SOLUTION INTRAVENOUS; SUBCUTANEOUS at 12:44

## 2021-12-16 RX ADMIN — METOPROLOL TARTRATE 12.5 MG: 25 TABLET, FILM COATED ORAL at 12:41

## 2021-12-16 RX ADMIN — Medication 10 ML: at 09:13

## 2021-12-16 RX ADMIN — ONDANSETRON 4 MG: 2 INJECTION INTRAMUSCULAR; INTRAVENOUS at 11:36

## 2021-12-16 RX ADMIN — POTASSIUM CHLORIDE 40 MEQ: 1500 TABLET, EXTENDED RELEASE ORAL at 12:41

## 2021-12-16 RX ADMIN — CHLORHEXIDINE GLUCONATE 15 ML: 1.2 RINSE ORAL at 20:20

## 2021-12-16 RX ADMIN — Medication 10 ML: at 20:20

## 2021-12-16 RX ADMIN — ASPIRIN 81 MG: 81 TABLET, COATED ORAL at 09:12

## 2021-12-16 RX ADMIN — POTASSIUM CHLORIDE 40 MEQ: 1500 TABLET, EXTENDED RELEASE ORAL at 17:01

## 2021-12-16 RX ADMIN — HYDROCODONE BITARTRATE AND ACETAMINOPHEN 1 TABLET: 5; 325 TABLET ORAL at 18:03

## 2021-12-16 RX ADMIN — METOPROLOL TARTRATE 12.5 MG: 25 TABLET, FILM COATED ORAL at 20:20

## 2021-12-16 RX ADMIN — DOCUSATE SODIUM 50 MG AND SENNOSIDES 8.6 MG 2 TABLET: 8.6; 5 TABLET, FILM COATED ORAL at 09:12

## 2021-12-16 RX ADMIN — HYDROCODONE BITARTRATE AND ACETAMINOPHEN 1 TABLET: 10; 325 TABLET ORAL at 09:09

## 2021-12-16 RX ADMIN — CYCLOBENZAPRINE 10 MG: 10 TABLET, FILM COATED ORAL at 09:09

## 2021-12-16 RX ADMIN — OXYCODONE 10 MG: 5 TABLET ORAL at 06:05

## 2021-12-16 RX ADMIN — HYDROCODONE BITARTRATE AND ACETAMINOPHEN 1 TABLET: 10; 325 TABLET ORAL at 02:14

## 2021-12-16 RX ADMIN — DOCUSATE SODIUM 50 MG AND SENNOSIDES 8.6 MG 2 TABLET: 8.6; 5 TABLET, FILM COATED ORAL at 20:20

## 2021-12-16 NOTE — PLAN OF CARE
Goal Outcome Evaluation:      Assessment: Saundra Holder presented with increased endurance and tolerance to ambulation today's treatment session. Moderate fatigue noted post treatment session. She presents with functional mobility impairments which indicate the need for skilled intervention. Tolerating session today without incident. Will continue to follow and progress as tolerated.     Plan/Recommendations:   Pt would benefit from Inpatient Rehabilitation placement at discharge from facility and requires no DME at discharge.   Pt desires Inpatient Rehabilitation placement at discharge. Pt cooperative; agreeable to therapeutic recommendations and plan of care.

## 2021-12-16 NOTE — PLAN OF CARE
Goal Outcome Evaluation:           Progress: improving  Outcome Summary: Pt remains on 2 L NC. [place], F/C and CTs discontinued. Pt walked with therapy in [place]. Educated use of IS with patient. Expalined risks of not using hit. Pt only acheive 500 on IS - pt demonstrated appropriate use and verbalizes understanding. CXR shows small pneumothorax - NP aware. Will continue to monitor.

## 2021-12-16 NOTE — THERAPY TREATMENT NOTE
Subjective: Pt agreeable to therapeutic plan of care.    Objective:     Bed mobility - Mod-A for supine to sitting eob.   Transfers - Min-A and with rolling walker, gait belt, and non-skid socks for sit to stand.   Ambulation - 60 feet CGA and with rolling walker, gait belt, and non-skid socks.     All vitals remained stable throughout treatment session.     Pain: 4 VAS  Education: Provided education on importance of mobility and skilled verbal / tactile cueing throughout intervention.     Assessment: Saundra Holder presented with increased endurance and tolerance to ambulation today's treatment session. Moderate fatigue noted post treatment session. She presents with functional mobility impairments which indicate the need for skilled intervention. Tolerating session today without incident. Will continue to follow and progress as tolerated.     Plan/Recommendations:   Pt would benefit from Inpatient Rehabilitation placement at discharge from facility and requires no DME at discharge.   Pt desires Inpatient Rehabilitation placement at discharge. Pt cooperative; agreeable to therapeutic recommendations and plan of care.     Basic Mobility 6-click:  Rollin = Total, A lot = 2, A little = 3; 4 = None  Supine>Sit:   1 = Total, A lot = 2, A little = 3; 4 = None   Sit>Stand with arms:  1 = Total, A lot = 2, A little = 3; 4 = None  Bed>Chair:   1 = Total, A lot = 2, A little = 3; 4 = None  Ambulate in room:  1 = Total, A lot = 2, A little = 3; 4 = None  3-5 Steps with railin = Total, A lot = 2, A little = 3; 4 = None  Score: 15    Modified [place]: N/A = No pre-op stroke/TIA    Post-Tx Position: Up in Chair, Staff Present, Alarms activated and Call light and personal items within reach. Staff present for chest x-ray.   PPE: gloves, surgical mask, eyewear protection

## 2021-12-16 NOTE — PLAN OF CARE
Goal Outcome Evaluation:    Pt is a 50 y/o F admitted for chest pain. Pt s/p urgent CABG x4 with LIMA 12/14. Hx of dm, htn, hld, asthma. Pt originally admitted on 12/13 for nstemi. 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 Their son, who is 20, lives w/ them and works in the afternoon and evenings. And their other son who lives w/ them is 15 yo. This date, pt is A&Ox3, able to recall 1/3 sternal precautions, req min A x2 to stand from chair, min Ax1 +1 for func mob in room. Pt provided hand out of sternal precautions and BUE AROM cardiac HEP. Completed BUE arom in sitting. Pt recalled 1/3 sternal precautions at end of session. Pt presents with impaired strength, endurance, safety, balance. Pt is a high falls risk and not safe for home. REC IP rehab at [place].    ZAIN Raymond, OTR

## 2021-12-16 NOTE — THERAPY EVALUATION
Patient Name: Saundra Holder  : 1970    MRN: [id number]                              Today's Date: 2021       Admit Date: 2021    Visit Dx:     ICD-10-CM ICD-9-CM   1. CAD, multiple vessel  I25.10 414.00     Patient Active Problem List   Diagnosis   • CAD, multiple vessel     History reviewed. No pertinent past medical history.  Past Surgical History:   Procedure Laterality Date   • CORONARY ARTERY BYPASS GRAFT N/A 2021    Procedure: CORONARY ARTERY BYPASS GRAFTING;  Surgeon: Samuel Berger MD;  Location: [facility];  Service: Cardiothoracic;  Laterality: N/A;  CABG x 4  3 vein grafts   1 LIMA  with intraoperative ANJEL      General Information     Row Name 21 1148          OT Time and Intention    Document Type evaluation  -NA     Mode of Treatment occupational therapy  -NA     Row Name 21 1148          General Information    Patient Profile Reviewed yes  -NA     Prior Level of Function independent:; feeding; grooming; bathing; dressing; home management; all household mobility; cooking; community mobility; gait; cleaning; driving; transfer; shopping; using stairs; bed mobility; ADL's; work  works full time as a CNA  -NA     Existing Precautions/Restrictions cardiac; fall; sternal; oxygen therapy device and L/min  -NA     Barriers to Rehab medically complex; family issues  -NA     Row Name 21 1148          Living Environment    Lives With spouse; child(aruna), dependent  -NA     Row Name 21 1148          Home Main Entrance    Number of Stairs, Main Entrance five  -NA     Stair Railings, Main Entrance railings safe and in good condition  -NA     Row Name 21 1148          Stairs Within Home, Primary    Number of Stairs, Within Home, Primary three  -NA     Row Name 21 1148          Cognition    Orientation Status (Cognition) oriented x 3; disoriented to; situation; verbal cues/prompts needed for orientation  -NA     Row Name 21 1148          Safety  Issues, Functional Mobility    Safety Issues Affecting Function (Mobility) problem-solving; safety precaution awareness; ability to follow commands; safety precautions follow-through/compliance; insight into deficits/self-awareness; positioning of assistive device  -NA     Impairments Affecting Function (Mobility) endurance/activity tolerance; pain; strength; range of motion (ROM); shortness of breath; grasp; balance  -NA           User Key  (r) = Recorded By, (t) = Taken By, (c) = Cosigned By    Initials Name Provider Type    NA Martha Barrett OT Occupational Therapist                 Mobility/ADL's     Row Name 12/16/21 1149          Bed Mobility    Comment (Bed Mobility) up in chair  -     Row Name 12/16/21 1149          Transfers    Transfers sit-stand transfer; bed-chair transfer  -NA     Bed-Chair [place] (Transfers) minimum assist (75% patient effort); 1 person to manage equipment; 1 person assist; verbal cues; nonverbal cues (demo/gesture)  -     Assistive Device (Bed-Chair Transfers) walker, front-wheeled  -     Sit-Stand [place] (Transfers) minimum assist (75% patient effort); verbal cues; nonverbal cues (demo/gesture); 1 person to manage equipment; 1 person assist  -     Row Name 12/16/21 1149          Sit-Stand Transfer    Assistive Device (Sit-Stand Transfers) walker, front-wheeled  -     Row Name 12/16/21 1149          Functional Mobility    Functional Mobility- Ind. Level minimum assist (75% patient effort); verbal cues required; nonverbal cues required (demo/gesture); 1 person + 1 person to manage equipment  -NA     Functional Mobility- Device rolling walker  -     Functional Mobility- Safety Issues supplemental O2; balance decreased during turns; loses balance backward  -NA     Functional Mobility- Comment unsteady on feet, slowed movements  -     Row Name 12/16/21 1149          Activities of Daily Living    BADL Assessment/Intervention lower body dressing; upper body  dressing; toileting  -NA     Row Name 12/16/21 1149          Mobility    Extremity Weight-bearing Status left upper extremity; right upper extremity  -NA     Left Upper Extremity (Weight-bearing Status) touch down weight-bearing (TDWB)  -NA     Right Upper Extremity (Weight-bearing Status) touch down weight-bearing (TDWB)  -NA     Row Name 12/16/21 1149          Lower Body Dressing Assessment/Training    [place] Level (Lower Body Dressing) don; doff; socks  -NA     Position (Lower Body Dressing) supported sitting  -NA     [facility] Name 12/16/21 1149          Upper Body Dressing Assessment/Training    [place] Level (Upper Body Dressing) maximum assist (25% patient effort)  -NA     Row Name 12/16/21 1149          Toileting Assessment/Training    Comment (Toileting) farzana  -NA           User Key  (r) = Recorded By, (t) = Taken By, (c) = Cosigned By    Initials Name Provider Type    NA Martha Barrett OT Occupational Therapist               Obj/Interventions     Row Name 12/16/21 1150          Sensory Assessment (Somatosensory)    Sensory Assessment (Somatosensory) sensation intact  -NA     Row Name 12/16/21 1150          Vision Assessment/Intervention    Visual Impairment/Limitations WFL  -NA     Row Name 12/16/21 1150          Range of Motion Comprehensive    General Range of Motion bilateral lower extremity ROM WFL; bilateral upper extremity ROM WFL  -NA     Row Name 12/16/21 1150          Strength Comprehensive (MMT)    General Manual Muscle Testing (MMT) Assessment lower extremity strength deficits identified; upper extremity strength deficits identified  -NA     Row Name 12/16/21 1150          Upper Extremity (Manual Muscle Testing)    Comment, MMT: Upper Extremity NT 2.2 sx  -NA     Row Name 12/16/21 1150          Balance    Balance Assessment sitting static balance; sitting dynamic balance; sit to stand dynamic balance  -NA     Static Sitting Balance WFL  -NA     Dynamic Sitting Balance mild impairment   -NA           User Key  (r) = Recorded By, (t) = Taken By, (c) = Cosigned By    Initials Name Provider Type    Martha Morales OT Occupational Therapist               Goals/Plan     Row Name 12/16/21 1252          Transfer Goal 1 (OT)    Activity/Assistive Device (Transfer Goal 1, OT) toilet; walker, rolling  -NA     [place] Level/Cues Needed (Transfer Goal 1, OT) modified independence  -NA     Time Frame (Transfer Goal 1, OT) 2 weeks  -NA     Row Name 12/16/21 1252          Toileting Goal 1 (OT)    Activity/Device (Toileting Goal 1, OT) toileting skills, all  -NA     [place] Level/Cues Needed (Toileting Goal 1, OT) modified independence  -NA     Time Frame (Toileting Goal 1, OT) 2 weeks  -NA     Row Name 12/16/21 1252          Grooming Goal 1 (OT)    Activity/Device (Grooming Goal 1, OT) grooming skills, all; hair care; oral care; wash face, hands  -NA     [place] (Grooming Goal 1, OT) modified independence  -NA     Time Frame (Grooming Goal 1, OT) 2 weeks  -NA     Row Name 12/16/21 1252          Therapy Assessment/Plan (OT)    Planned Therapy Interventions (OT) activity tolerance training; patient/caregiver education/training; neuromuscular control/coordination retraining; ROM/therapeutic exercise; occupation/activity based interventions; strengthening exercise; transfer/mobility retraining; functional balance retraining; IADL retraining  -NA           User Key  (r) = Recorded By, (t) = Taken By, (c) = Cosigned By    Initials Name Provider Type    Martha Morales OT Occupational Therapist               Clinical Impression     Row Name 12/16/21 1150          Pain Assessment    Additional Documentation Pain Scale: Numbers Pre/Post-Treatment (Group)  -NA     Row Name 12/16/21 1150          Pain Scale: Numbers Pre/Post-Treatment    Pretreatment Pain Rating 8/10  -NA     Posttreatment Pain Rating 8/10  -NA     Row Name 12/16/21 1150          Plan of Care Review    Plan of Care Reviewed With  patient  -NA     Outcome Summary Pt is a 52 y/o F admitted for chest pain. Pt s/p urgent CABG x4 with LIMA 12/14. Hx of dm, htn, hld, asthma. Pt originally admitted on 12/13 for nstemi. 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 Their son, who is 20, lives w/ them and works in the afternoon and evenings. And their other son who lives w/ them is 15 yo. This date, pt is A&Ox3, able to recall 1/3 sternal precautions, req min A x2 to stand from chair, min Ax1 +1 for func mob in room. Pt provided hand out of sternal precautions and BUE AROM cardiac HEP. Completed BUE arom in sitting. Pt recalled 1/3 sternal precautions at end of session. Pt presents with impaired strength, endurance, safety, balance. Pt is a high falls risk and not safe for home. REC IP rehab at [place].  -NA     Row Name 12/16/21 1150          Therapy Assessment/Plan (OT)    Rehab Potential (OT) good, to achieve stated therapy goals  -NA     Criteria for Skilled Therapeutic Interventions Met (OT) yes; skilled treatment is necessary; meets criteria  -NA     Therapy Frequency (OT) 5 times/wk  -NA     Row Name 12/16/21 1150          Vital Signs    Pre Systolic BP Rehab 109  -NA     Pre Treatment Diastolic BP 84  -NA     Pretreatment Heart Rate (beats/min) 97  -NA     Pre SpO2 (%) 93  -NA     O2 Delivery Pre Treatment nasal cannula  -NA     O2 Delivery Intra Treatment nasal cannula  -NA     Post SpO2 (%) 92  -NA     O2 Delivery Post Treatment nasal cannula  -NA     Pre Patient Position Sitting  -NA     Intra Patient Position Standing  -NA     Post Patient Position Sitting  -NA     Row Name 12/16/21 1150          Positioning and Restraints    Pre-Treatment Position sitting in chair/recliner  -NA     Post Treatment Position chair  -NA     In Chair notified nsg; reclined; call light within reach; encouraged to call for assist; exit alarm on  -NA           User  Key  (r) = Recorded By, (t) = Taken By, (c) = Cosigned By    Initials Name Provider Type    Martha Morales OT Occupational Therapist               Outcome Measures     Row Name 12/16/21 1253          How much help from another is currently needed...    Putting on and taking off regular lower body clothing? 2  -NA     Bathing (including washing, rinsing, and drying) 2  -NA     Toileting (which includes using toilet bed pan or urinal) 2  -NA     Putting on and taking off regular upper body clothing 2  -NA     Taking care of personal grooming (such as brushing teeth) 3  -NA     Eating meals 3  -NA     AM-PAC 6 Clicks Score (OT) 14  -NA     Row Name 12/16/21 1253          How much help from another person do you currently need...    Turning from your back to your side while in flat bed without using bedrails? 2  -NA     Moving from lying on back to sitting on the side of a flat bed without bedrails? 2  -NA     Moving to and from a bed to a chair (including a wheelchair)? 2  -NA     Standing up from a chair using your arms (e.g., wheelchair, bedside chair)? 3  -NA     Climbing 3-5 steps with a railing? 1  -NA     To walk in hospital room? 2  -NA     AM-PAC 6 Clicks Score (PT) 12  -NA     Row Name 12/16/21 1253          Modified [place] Scale    Pre-Stroke Modified [place] Scale 6 - Unable to determine (UTD) from the medical record documentation  -NA     Modified [place] Scale 4 - Moderately severe disability.  Unable to walk without assistance, and unable to attend to own bodily needs without assistance.  -NA     Row Name 12/16/21 1253          Functional Assessment    Outcome Measure Options AM-PAC 6 Clicks Basic Mobility (PT); AM-PAC 6 Clicks Daily Activity (OT); Modified [place]  -NA           User Key  (r) = Recorded By, (t) = Taken By, (c) = Cosigned By    Initials Name Provider Type    Martha Morales OT Occupational Therapist                Occupational Therapy Education                 Title: PT OT SLP  Therapies (Done)     Topic: Occupational Therapy (Done)     Point: ADL training (Done)     Description:   Instruct learner(s) on proper safety adaptation and remediation techniques during self care or transfers.   Instruct in proper use of assistive devices.              Learning Progress Summary           Patient Acceptance, E,TB, VU,NR by NA at 12/16/2021 1253    Comment: role and purpose of oT, DC rec, using call light                   Point: Home exercise program (Done)     Description:   Instruct learner(s) on appropriate technique for monitoring, assisting and/or progressing therapeutic exercises/activities.              Learning Progress Summary           Patient Acceptance, E,TB, VU,NR by NA at 12/16/2021 1253    Comment: role and purpose of oT, DC rec, using call light                   Point: Precautions (Done)     Description:   Instruct learner(s) on prescribed precautions during self-care and functional transfers.              Learning Progress Summary           Patient Acceptance, E,TB, VU,NR by NA at 12/16/2021 1253    Comment: role and purpose of oT, DC rec, using call light                   Point: Body mechanics (Done)     Description:   Instruct learner(s) on proper positioning and spine alignment during self-care, functional mobility activities and/or exercises.              Learning Progress Summary           Patient Acceptance, E,TB, VU,NR by NA at 12/16/2021 1253    Comment: role and purpose of oT, DC rec, using call light                               User Key     Initials Effective Dates Name Provider Type Discipline    DAYANA 09/30/20 -  Martha Barrett OT Occupational Therapist OT              OT Recommendation and Plan  Planned Therapy Interventions (OT): activity tolerance training, patient/caregiver education/training, neuromuscular control/coordination retraining, ROM/therapeutic exercise, occupation/activity based interventions, strengthening exercise, transfer/mobility retraining,  functional balance retraining, IADL retraining  Therapy Frequency (OT): 5 times/wk  Plan of Care Review  Plan of Care Reviewed With: patient  Outcome Summary: Pt is a 50 y/o F admitted for chest pain. Pt s/p urgent CABG x4 with LIMA 12/14. Hx of dm, htn, hld, asthma. Pt originally admitted on 12/13 for nstemi. 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 Their son, who is 20, lives w/ them and works in the afternoon and evenings. And their other son who lives w/ them is 15 yo. This date, pt is A&Ox3, able to recall 1/3 sternal precautions, req min A x2 to stand from chair, min Ax1 +1 for func mob in room. Pt provided hand out of sternal precautions and BUE AROM cardiac HEP. Completed BUE arom in sitting. Pt recalled 1/3 sternal precautions at end of session. Pt presents with impaired strength, endurance, safety, balance. Pt is a high falls risk and not safe for home. REC IP rehab at [place].     Time Calculation:    Time Calculation- OT     Row Name 12/16/21 1253             Time Calculation- OT    OT Start Time 0801  -NA      OT Stop Time 0845  -NA      OT Time Calculation (min) 44 min  -NA      Total Timed Code Minutes- OT 15 minute(s)  -NA      OT Received On 12/16/21  -NA      OT - Next Appointment 12/17/21  -NA      OT Goal Re-Cert Due Date 12/30/21  -NA            User Key  (r) = Recorded By, (t) = Taken By, (c) = Cosigned By    Initials Name Provider Type    NA Martha Barrett OT Occupational Therapist              Therapy Charges for Today     Code Description Service Date Service Provider Modifiers Qty    70021093020 HC OT EVAL MOD COMPLEXITY 3 12/16/2021 Martha Barrett OT GO 1    02135032012 HC OT SELF CARE/MGMT/TRAIN EA 15 MIN 12/16/2021 Martha Barrett OT GO 1               Martha Barrett OT  12/16/2021

## 2021-12-16 NOTE — PLAN OF CARE
Goal Outcome Evaluation:               Pt insulin drip was turned off due to blood sugar dropping while being on 0.4 units/hr. Will continue to check sugars. Pt remains on 1-2 liters mostly for comfort. Minimal chest tube drainage and adequate urine output. Pt stats pain level is at a 3-4 mostly and has been controlled per MAR. Continuous to be paced at 90 bpm. Pt is hypotensive without being paced. She denies any other needs at this time. Will continue to monitor.

## 2021-12-17 ENCOUNTER — APPOINTMENT (OUTPATIENT)
Dept: CARDIOLOGY | Facility: HOSPITAL | Age: 51
End: 2021-12-17

## 2021-12-17 ENCOUNTER — APPOINTMENT (OUTPATIENT)
Dept: GENERAL RADIOLOGY | Facility: HOSPITAL | Age: 51
End: 2021-12-17

## 2021-12-17 LAB
ANION GAP SERPL CALCULATED.3IONS-SCNC: 13 MMOL/L (ref 5–15)
BUN SERPL-MCNC: 27 MG/DL (ref 6–20)
BUN/CREAT SERPL: 46.6 (ref 7–25)
CALCIUM SPEC-SCNC: 9.4 MG/DL (ref 8.6–10.5)
CHLORIDE SERPL-SCNC: 106 MMOL/L (ref 98–107)
CO2 SERPL-SCNC: 21 MMOL/L (ref 22–29)
CREAT SERPL-MCNC: 0.58 MG/DL (ref 0.57–1)
DEPRECATED RDW RBC AUTO: 40.7 FL (ref 37–54)
ERYTHROCYTE [DISTWIDTH] IN BLOOD BY AUTOMATED COUNT: 13.5 % (ref 12.3–15.4)
GFR SERPL CREATININE-BSD FRML MDRD: 110 ML/MIN/1.73
GLUCOSE BLDC GLUCOMTR-MCNC: 157 MG/DL (ref 70–105)
GLUCOSE BLDC GLUCOMTR-MCNC: 157 MG/DL (ref 70–105)
GLUCOSE BLDC GLUCOMTR-MCNC: 164 MG/DL (ref 70–105)
GLUCOSE BLDC GLUCOMTR-MCNC: 164 MG/DL (ref 70–105)
GLUCOSE SERPL-MCNC: 138 MG/DL (ref 65–99)
HCT VFR BLD AUTO: 25.5 % (ref 34–46.6)
HGB BLD-MCNC: 8.6 G/DL (ref 12–15.9)
MCH RBC QN AUTO: 29.2 PG (ref 26.6–33)
MCHC RBC AUTO-ENTMCNC: 33.6 G/DL (ref 31.5–35.7)
MCV RBC AUTO: 86.8 FL (ref 79–97)
PLATELET # BLD AUTO: 247 10*3/MM3 (ref 140–450)
PMV BLD AUTO: 7.2 FL (ref 6–12)
POTASSIUM SERPL-SCNC: 4.6 MMOL/L (ref 3.5–5.2)
RBC # BLD AUTO: 2.94 10*6/MM3 (ref 3.77–5.28)
SODIUM SERPL-SCNC: 140 MMOL/L (ref 136–145)
WBC NRBC COR # BLD: 10.9 10*3/MM3 (ref 3.4–10.8)

## 2021-12-17 PROCEDURE — 93308 TTE F-UP OR LMTD: CPT

## 2021-12-17 PROCEDURE — 71045 X-RAY EXAM CHEST 1 VIEW: CPT

## 2021-12-17 PROCEDURE — 97530 THERAPEUTIC ACTIVITIES: CPT

## 2021-12-17 PROCEDURE — 80048 BASIC METABOLIC PNL TOTAL CA: CPT | Performed by: THORACIC SURGERY (CARDIOTHORACIC VASCULAR SURGERY)

## 2021-12-17 PROCEDURE — 93308 TTE F-UP OR LMTD: CPT | Performed by: INTERNAL MEDICINE

## 2021-12-17 PROCEDURE — 82962 GLUCOSE BLOOD TEST: CPT

## 2021-12-17 PROCEDURE — 97110 THERAPEUTIC EXERCISES: CPT

## 2021-12-17 PROCEDURE — 85027 COMPLETE CBC AUTOMATED: CPT | Performed by: THORACIC SURGERY (CARDIOTHORACIC VASCULAR SURGERY)

## 2021-12-17 PROCEDURE — 97116 GAIT TRAINING THERAPY: CPT

## 2021-12-17 PROCEDURE — 63710000001 INSULIN LISPRO (HUMAN) PER 5 UNITS: Performed by: NURSE PRACTITIONER

## 2021-12-17 PROCEDURE — 99024 POSTOP FOLLOW-UP VISIT: CPT | Performed by: THORACIC SURGERY (CARDIOTHORACIC VASCULAR SURGERY)

## 2021-12-17 RX ORDER — CLOPIDOGREL BISULFATE 75 MG/1
75 TABLET ORAL DAILY
Status: DISCONTINUED | OUTPATIENT
Start: 2021-12-18 | End: 2021-12-21 | Stop reason: HOSPADM

## 2021-12-17 RX ORDER — POLYETHYLENE GLYCOL 3350 17 G/17G
17 POWDER, FOR SOLUTION ORAL 2 TIMES DAILY
Status: DISCONTINUED | OUTPATIENT
Start: 2021-12-17 | End: 2021-12-21 | Stop reason: HOSPADM

## 2021-12-17 RX ADMIN — FUROSEMIDE 20 MG: 20 TABLET ORAL at 08:26

## 2021-12-17 RX ADMIN — METOPROLOL TARTRATE 12.5 MG: 25 TABLET, FILM COATED ORAL at 08:26

## 2021-12-17 RX ADMIN — HYDROCODONE BITARTRATE AND ACETAMINOPHEN 1 TABLET: 5; 325 TABLET ORAL at 06:00

## 2021-12-17 RX ADMIN — Medication 10 ML: at 20:01

## 2021-12-17 RX ADMIN — PANTOPRAZOLE SODIUM 40 MG: 40 TABLET, DELAYED RELEASE ORAL at 06:00

## 2021-12-17 RX ADMIN — SODIUM CHLORIDE 250 ML: 9 INJECTION, SOLUTION INTRAVENOUS at 13:04

## 2021-12-17 RX ADMIN — DOCUSATE SODIUM 50 MG AND SENNOSIDES 8.6 MG 2 TABLET: 8.6; 5 TABLET, FILM COATED ORAL at 08:26

## 2021-12-17 RX ADMIN — HYDROCODONE BITARTRATE AND ACETAMINOPHEN 1 TABLET: 5; 325 TABLET ORAL at 13:03

## 2021-12-17 RX ADMIN — DOCUSATE SODIUM 50 MG AND SENNOSIDES 8.6 MG 2 TABLET: 8.6; 5 TABLET, FILM COATED ORAL at 20:01

## 2021-12-17 RX ADMIN — ATORVASTATIN CALCIUM 40 MG: 40 TABLET, FILM COATED ORAL at 20:01

## 2021-12-17 RX ADMIN — INSULIN LISPRO 2 UNITS: 100 INJECTION, SOLUTION INTRAVENOUS; SUBCUTANEOUS at 13:03

## 2021-12-17 RX ADMIN — Medication 10 ML: at 08:26

## 2021-12-17 RX ADMIN — ASPIRIN 81 MG: 81 TABLET, COATED ORAL at 08:26

## 2021-12-17 RX ADMIN — POLYETHYLENE GLYCOL 3350 17 G: 17 POWDER, FOR SOLUTION ORAL at 13:11

## 2021-12-17 RX ADMIN — METOPROLOL TARTRATE 12.5 MG: 25 TABLET, FILM COATED ORAL at 20:01

## 2021-12-17 RX ADMIN — ACETAMINOPHEN 650 MG: 325 TABLET, FILM COATED ORAL at 20:01

## 2021-12-17 NOTE — PLAN OF CARE
Goal Outcome Evaluation:  Plan of Care Reviewed With: patient        Progress: improving  Outcome Summary: Pt remains on 2L NC. Wires in place and connected to pacer but pacer off. Pt ambulated assist x1 with walker. Pt due to void post FC removal this evening. VSS at this time. Will continue to monitor.

## 2021-12-17 NOTE — PAYOR COMM NOTE
"  RECONSIDERATION, Ref # [id number]  Please call [phone] with questions or concerns.   Thank you,     Monae Martin, MSN, RN       Cheo Schilling (51 y.o. Female)             Date of Birth Social Security Number Address Home Phone MRN    1970  [id number]  [place] [address] [phone] [id number]    Hoahaoism Marital Status             None        Admission Date Admission Type Admitting Provider Attending Provider Department, Room/Bed    12/13/21 Urgent Samuel Berger MD Pagni, Sebastian, MD [facility] CARDIOVASCULAR CARE UNIT, 2205/1    Discharge Date Discharge Disposition Discharge Destination                         Attending Provider: Samuel Berger MD    Allergies: Nitrofuran Derivatives    Isolation: None   Infection: None   Code Status: CPR   Advance Care Planning Activity    Ht: 165.1 cm (65\")   Wt: 91.3 kg (201 lb 4.5 oz)    Admission Cmt: None   Principal Problem: None                Active Insurance as of 12/13/2021     Primary Coverage     Payor Plan Insurance Group Employer/Plan Group    [employer]HEM BLUE CROSS AN[employer]M BLUE CROSS BLUE SHIELD PPO [id number]     Payor Plan Address Payor Plan Phone Number Payor Plan Fax Number Effective Dates    [address] [phone]  1/1/2021 - None Entered    [place] [address]       Subscriber Name Subscriber Birth Date Member ID       CHEO SCHILLING 1970 [id number]               Created Using Review Status Review Entered   Epic In Primary 12/17/2021 12:09       Criteria Set Name - Subset   NSTEMI, Severe Three-Vessel CAD, CABG 12/14/21      Criteria Review      INPT order verified.  INPT [facility] criteria met.     Patient transferred to [facility] 12/13/21 from ER in [place], [place] with diagnosis of NSTEMI.   Patient had Cardiac Cath at that facility revealing severe three-vessel coronary artery disease.   Patient transferred to [facility] for urgent CABG.      CABG completed 12/14/21. "      Coronary Artery Bypass Graft (CABG) RRG  RRG: [id number] ([facility])  Link to Codes  Goal Length of Stay - 4 days postoperative  Procedure is indicated for 1 or more of the following:    Three-vessel coronary artery stenosis  Operative Status Criteria: Inpatient              History & Physical      Marci Gonzalez PA at 12/14/21 0900     Attestation signed by Samuel Berger MD at 12/16/21 1014    I have reviewed this documentation and agree.                      Patient Care Team:  Treasure Dickinson APRN as PCP - General (Nurse Practitioner)    Chief complaint: Chest pain    Subjective     History of Present Illness:    Mrs. Holder is a 51 year-old female with PMH of HTN, HLD, and DM who presented to the ED in [place], [place] with a chief complaint of chest pain. She describes the chest pain as a heaviness in the center of her chest that radiates to her left arm and is associated with shortness of breath. It first started several weeks ago, but has gotten progressively worse. She states it initially started with activity and improved with rest, but now even occurs at rest. In the ED her troponin was found to be elevated and she was ruled in for a NSTEMI. She then underwent cardiac catheterization which revealed EF 50-55% and severe 3 vessel CAD. Patient was subsequently transferred to [facility] last night for surgical evaluation. Patient is on a Heparin drip and currently denies chest pain.     Review of Systems   Constitutional: Positive for activity change and fatigue.   Respiratory: Positive for chest tightness and shortness of breath.    Cardiovascular: Positive for chest pain.        History reviewed. No pertinent past medical history.   HTN  HLD  DM  Asthma    History reviewed. No pertinent surgical history.     History reviewed. No pertinent family history.   Mother had heart disease    Social History     Tobacco Use   • Smoking status: Never Smoker   • Smokeless tobacco: Never Used  "  Substance Use Topics   • Alcohol use: Not on file   • Drug use: Not on file     Medications Prior to Admission   Medication Sig Dispense Refill Last Dose   • lisinopril (PRINIVIL,ZESTRIL) 10 MG tablet Take 15 mg by mouth Every 12 (Twelve) Hours.   12/13/2021 at Unknown time   • metFORMIN (GLUCOPHAGE) 500 MG tablet Take 500 mg by mouth Daily With Breakfast.   12/13/2021 at Unknown time   • metoprolol tartrate (LOPRESSOR) 25 MG tablet Take 12.5 mg by mouth 2 (Two) Times a Day As Needed.   12/14/2021 at Unknown time     aspirin, 81 mg, Oral, Daily  ceFAZolin, 2 g, Intravenous, Once  chlorhexidine, 15 mL, Mouth/Throat, Q12H  Chlorhexidine Gluconate Cloth, 1 application, Topical, Q12H  insulin lispro, 0-7 Units, Subcutaneous, TID AC  metoprolol tartrate, 12.5 mg, Oral, Q12H  [START ON 12/15/2021] metoprolol tartrate, 12.5 mg, Oral, On Call to OR  mupirocin, 1 application, Each Nare, Q12H  sodium chloride, 10 mL, Intravenous, Q12H      Allergies:  Nitrofuran derivatives    Objective      Vital Signs  Temp:  [98.2 °F (36.8 °C)-98.5 °F (36.9 °C)] 98.4 °F (36.9 °C)  Heart Rate:  [61-83] 80  Resp:  [16-21] 16  BP: (104-175)/(50-97) 158/90    Flowsheet Rows      First Filed Value   Admission Height 165.1 cm (65\") Documented at 12/13/2021 1939   Admission Weight 93.1 kg (205 lb 4 oz) Documented at 12/13/2021 1939        165.1 cm (65\")    Physical Exam  Vitals and nursing note reviewed.   Constitutional:       General: She is not in acute distress.     Appearance: Normal appearance. She is obese. She is not ill-appearing or diaphoretic.   HENT:      Head: Normocephalic and atraumatic.      Nose: Nose normal.      Mouth/Throat:      Mouth: Mucous membranes are moist.      Pharynx: Oropharynx is clear.   Eyes:      Extraocular Movements: Extraocular movements intact.      Conjunctiva/sclera: Conjunctivae normal.      Pupils: Pupils are equal, round, and reactive to light.   Cardiovascular:      Rate and Rhythm: Normal rate and " regular rhythm.      Heart sounds: No murmur heard.      Pulmonary:      Effort: Pulmonary effort is normal.      Breath sounds: Normal breath sounds.   Abdominal:      General: Bowel sounds are normal.      Palpations: Abdomen is soft.   Musculoskeletal:         General: Normal range of motion.      Cervical back: Normal range of motion and neck supple.      Right lower leg: No edema.      Left lower leg: No edema.   Skin:     General: Skin is warm and dry.   Neurological:      General: No focal deficit present.      Mental Status: She is alert and oriented to person, place, and time.   Psychiatric:         Mood and Affect: Mood normal.         Behavior: Behavior normal.         Results Review:   Lab Results (last 24 hours)     Procedure Component Value Units Date/Time    Lipid Panel [[id number]] Collected: 12/14/21 0330    Specimen: Blood Updated: 12/14/21 0935    Hemoglobin A1c [[id number]] Collected: 12/14/21 0330    Specimen: Blood Updated: 12/14/21 0935    POC Glucose Once [[id number]]  (Abnormal) Collected: 12/14/21 0734    Specimen: Blood Updated: 12/14/21 0734     Glucose 137 mg/dL      Comment: Serial Number: [id number]:  [id number]       COVID PRE-OP / PRE-PROCEDURE SCREENING ORDER (NO ISOLATION) - Swab, Nasopharynx [[id number]]  (Normal) Collected: 12/14/21 0337    Specimen: Swab from Nasopharynx Updated: 12/14/21 0404    Narrative:      The following orders were created for panel order COVID PRE-OP / PRE-PROCEDURE SCREENING ORDER (NO ISOLATION) - Swab, Nasopharynx.  Procedure                               Abnormality         Status                     ---------                               -----------         ------                     COVID-19,CEPHEID/CHEYANNE,CO...[[id number]]  Normal              Final result                 Please view results for these tests on the individual orders.    COVID-19,CEPHEID/CHEYANNE,COR/GERA/PAD/LEANN IN-HOUSE(OR EMERGENT/ADD-ON),NP SWAB IN TRANSPORT MEDIA 3-4 HR TAT, RT-PCR -  Swab, Nasopharynx [[id number]]  (Normal) Collected: 12/14/21 0337    Specimen: Swab from Nasopharynx Updated: 12/14/21 0404     COVID19 Not Detected    Narrative:      Fact sheet for providers: https://www.fda.gov/media/375002/download     Fact sheet for patients: https://www.fda.gov/media/076530/download  Fact sheet for providers: https://www.fda.gov/media/396991/download    Fact sheet for patients: https://www.fda.gov/media/981362/download    Test performed by PCR.    Basic Metabolic Panel [[id number]]  (Abnormal) Collected: 12/14/21 0330    Specimen: Blood Updated: 12/14/21 0403     Glucose 144 mg/dL      BUN 20 mg/dL      Creatinine 0.58 mg/dL      Sodium 139 mmol/L      Potassium 4.2 mmol/L      Chloride 102 mmol/L      CO2 24.0 mmol/L      Calcium 9.0 mg/dL      eGFR Non African Amer 110 mL/min/1.73      BUN/Creatinine Ratio 34.5     Anion Gap 13.0 mmol/L     Narrative:      GFR Normal >60  Chronic Kidney Disease <60  Kidney Failure <15      Hepatic Function Panel [[id number]]  (Abnormal) Collected: 12/14/21 0330    Specimen: Blood Updated: 12/14/21 0403     Total Protein 6.8 g/dL      Albumin 3.40 g/dL      ALT (SGPT) 43 U/L      AST (SGOT) 39 U/L      Alkaline Phosphatase 93 U/L      Total Bilirubin 0.4 mg/dL      Bilirubin, Direct 0.2 mg/dL      Bilirubin, Indirect 0.2 mg/dL     aPTT [[id number]]  (Abnormal) Collected: 12/14/21 0330    Specimen: Blood Updated: 12/14/21 0402     PTT 46.5 seconds     Protime-INR [[id number]]  (Normal) Collected: 12/14/21 0330    Specimen: Blood Updated: 12/14/21 0402     Protime 11.1 Seconds      INR 1.00    CBC & Differential [[id number]]  (Abnormal) Collected: 12/14/21 0330    Specimen: Blood Updated: 12/14/21 0346    Narrative:      The following orders were created for panel order CBC & Differential.  Procedure                               Abnormality         Status                     ---------                               -----------         ------                     CBC  Auto Differential[[id number]]        Abnormal            Final result                 Please view results for these tests on the individual orders.    CBC Auto Differential [[id number]]  (Abnormal) Collected: 12/14/21 0330    Specimen: Blood Updated: 12/14/21 0346     WBC 12.20 10*3/mm3      RBC 4.39 10*6/mm3      Hemoglobin 12.4 g/dL      Hematocrit 37.3 %      MCV 84.9 fL      MCH 28.3 pg      MCHC 33.3 g/dL      RDW 13.4 %      RDW-SD 40.3 fl      MPV 6.9 fL      Platelets 353 10*3/mm3      Neutrophil % 63.3 %      Lymphocyte % 25.6 %      Monocyte % 6.6 %      Eosinophil % 3.3 %      Basophil % 1.2 %      Neutrophils, Absolute 7.70 10*3/mm3      Lymphocytes, Absolute 3.10 10*3/mm3      Monocytes, Absolute 0.80 10*3/mm3      Eosinophils, Absolute 0.40 10*3/mm3      Basophils, Absolute 0.20 10*3/mm3      nRBC 0.1 /100 WBC     aPTT [[id number]]  (Abnormal) Collected: 12/13/21 2019    Specimen: Blood Updated: 12/13/21 2040     PTT 30.8 seconds     Protime-INR [[id number]]  (Normal) Collected: 12/13/21 2019    Specimen: Blood Updated: 12/13/21 2040     Protime 10.8 Seconds      INR 0.97    CBC & Differential [[id number]]  (Normal) Collected: 12/13/21 2019    Specimen: Blood Updated: 12/13/21 2026    Narrative:      The following orders were created for panel order CBC & Differential.  Procedure                               Abnormality         Status                     ---------                               -----------         ------                     CBC Auto Differential[[id number]]        Normal              Final result                 Please view results for these tests on the individual orders.    CBC Auto Differential [[id number]]  (Normal) Collected: 12/13/21 2019    Specimen: Blood Updated: 12/13/21 2026     WBC 9.90 10*3/mm3      RBC 4.26 10*6/mm3      Hemoglobin 12.0 g/dL      Hematocrit 35.6 %      MCV 83.6 fL      MCH 28.2 pg      MCHC 33.8 g/dL      RDW 13.2 %      RDW-SD 38.9 fl      MPV 6.8 fL       Platelets 341 10*3/mm3      Neutrophil % 68.2 %      Lymphocyte % 20.9 %      Monocyte % 6.7 %      Eosinophil % 2.9 %      Basophil % 1.3 %      Neutrophils, Absolute 6.80 10*3/mm3      Lymphocytes, Absolute 2.10 10*3/mm3      Monocytes, Absolute 0.70 10*3/mm3      Eosinophils, Absolute 0.30 10*3/mm3      Basophils, Absolute 0.10 10*3/mm3      nRBC 0.1 /100 WBC     POC Glucose Once [[id number]]  (Abnormal) Collected: 12/13/21 1921    Specimen: Blood Updated: 12/13/21 1922     Glucose 157 mg/dL      Comment: Serial Number: [id number]:  [id number]                   Assessment & Plan    · Severe 3 vessel CAD---on Heparin drip  · NSTEMI  · Normal LV systolic function---EF 50-55%  · HTN  · HLD  · Non-insulin dependent DM   · Asthma  · Obesity---BMI 32.95      Work-up ongoing for likely CABG. Vein mapping and carotid duplex are pending. Dr. Berger to review films and make final recommendation, but tentative plan for surgery this afternoon. NPO. Discontinue Heparin drip on call to OR. Urine pregnancy test pending, but was negative at [place].        GONZALEZ Pitts  12/14/21  09:39 EST    Electronically signed by Samuel Berger MD at 12/16/21 1014          Operative/Procedure Notes (last 72 hours)      Samuel Berger MD at 12/14/21 1522        Operative Note    Date of Dictation: 12/14/21    Date of Procedure: Same    Referring Physician: Treasure Dickinson APRN    Preoperative diagnosis:   1.  Severe three-vessel coronary artery disease  2.  Non-ST elevation MI  3.  Angina pectoris    Postoperative diagnosis:   Same    Procedure:   1.  Urgent CABG x fall with a LIMA to the mid LAD and reverse individual saphenous vein graft to the PDA, and OM 1 and 2  2. EVH of the right legs    Surgeon: Samuel Berger MD     Assistants: GONZALEZ Candelaria and Luanne Romero M.D. Assistant: Connor Solitario PA-C was responsible for performing the following activities: Retraction, Suction, Irrigation, Suturing,  Closing, Placing Dressing, Harvesting of Vessels, Bypass Grafting and All aspects of the cardiac case and their skilled assistance was necessary for the success of this case.    Anesthesia: General endotracheal anesthesia and ANJEL    Findings:  The saphenous vein was harvested endoscopically form the right  leg. The vein had a diameter of 4 mm and was of fair quality. The coronaries had diameters between 1.5 and 1.75 mm.    Estimated Blood Loss: Approximately 400 cc, most of it recorded with a Cell Saver device and cardiotomy suckers I was retransfused to the patient    STS Data:    The patient was explained the risks (STS risk score calculated), benefits and alternatives of surgery and agreed to proceed. The antibiotics and b blockers were given in the STS required window.  Consent was given about diet, alcohol and tobacco use as needed        Description of the procedure:     The patient was placed supine on the operative table. General anesthesia was given and lines placed. The patient was prepped and draped using the usual sterile technique. A median sternotomy was performed with a scalpel and the layers carried down to the sternum using the electrocautery. The sternum was split in the midline using a vertical oscillating saw. Hemostasis was achieved. The LIMA was harvested as a pedicle and prepared with papaverine. It was of good quality. 300 units/kg of IV heparin was given to an ACT over 400. A Favaloro retractor was placed and the mediastinum exposed, the pericardium was opened and the edges tacked to the wound. Cannulation sutures were placed in the ascending aorta and right atrium. Small cannulas were placed and aorto right atrial cardiopulmonary bypass was started. Cardioplegia cannulas were placed and then the ascending aorta was clamped. One liter of cold blood cardioplegia was given in an antegrade fashion to achieved diastolic arrest and further doses every 15 minutes thereafter. Constructions of  grafts were done in the sequence distal - proximal between the reversedsaphenousveingraft and each coronary targets. Grafts were constructed to the PDA, OM1 and OM2 coronary arteries and plegia was given between graft sequences after de-airing the aortic root and tying the proximal anastomosis. The LIMA was anastomosed to the mid LAD using 7.0 prolene continuous suture with a small needle, then the warm dose of cardioplegia was given and then the aortic clamp removed as well as the LIMA bulldog clamp. All anastomoses were checked and had good flow and morphology. The left pleural space was suctioned and the lungs ventilated. The heart was paced till regular atrial rhythm resumed. I allowed the heart to eject and once hemodynamics were acceptable, then the CPB was discontinued and the venous and cardioplegia cannulas removed. The matching dose of protamine was given and the aortic cannula removed as well. AV temporary wires and pleural and mediastinal chest tubes were placed and the wound sprayed with platelet rich plasma.  The sternum was closed with single and double wires and soft tissue in layers of reabsorbable material. The wounds were covered with sterile dressings.       Specimen removed: None    CPB time: 60 minutes    Aortic clamp time: 48 minutes    Complications:  none           Disposition: Cardiovascular recovery room           Condition: Critical but stable.

## 2021-12-17 NOTE — CONSULTS
Diabetes Education    Patient Name:  Saundra Holder  YOB: 1970  MRN: [id number]  Admit Date:  12/13/2021        Follow up from education provided on 12/16/21. Patient given the AccuChek Guide meter. Educated pt on the use of the meter and lancing device. Patient states she is on metformin at home so encouraged pt to check blood sugars once a day and rotate the time of day she is checking. Patient did not want to check a blood sugar at this time, but verbalized understanding of the use. No further education needed at this time.       Electronically signed by:  Leydi Wong RN  12/17/21 12:52 EST

## 2021-12-17 NOTE — PLAN OF CARE
Goal Outcome Evaluation:    Assessment: Saundra Holder is improving in her ability to ambulate, but still needs assistance. Her breathing is very shallow and she suppresses breathing. She presents with functional mobility impairments which indicate the need for skilled intervention. Tolerating session today without incident. Will continue to follow and progress as tolerated.     Plan/Recommendations:   Pt would benefit from Inpatient Rehabilitation placement at discharge from facility and requires no DME at discharge.   Pt desires Inpatient Rehabilitation placement at discharge. Pt cooperative; agreeable to therapeutic recommendations and plan of care.

## 2021-12-17 NOTE — CONSULTS
Diabetes Education    Patient Name:  Saundra Holder  YOB: 1970  MRN: [id number]  Admit Date:  12/13/2021    Pt seen due to A1c of 7.9% this adm. Pt dx about 2 years ago. She is taking Metformin 500 mg at supper. She has PCP and seeing yearly. Discussed A1c result of 7.9%. Reviewed healthy bs range and healthy A1c target. Discussed importance of bs control. Pt has not been routinely checking bs and not sure if she still has bs meter. Pt requesting new bs meter but asking if educator can follow up on different day. Educator will attempt follow up on different day to give meter and instruct. Gave A1c info sheet.       Electronically signed by:  Eladia Sharpe RN  12/16/21 21:06 EST

## 2021-12-17 NOTE — CASE MANAGEMENT/SOCIAL WORK
Continued Stay Note  VIC Romero     Patient Name: Saundra Holder  MRN: [id number]  Today's Date: 12/17/2021    Admit Date: 12/13/2021     Discharge Plan     Row Name 12/17/21 1553       Plan    Plan Referral to [facility],, pending acceptance.  2nd choice [facility]    Plan Comments Spoke with patient regarding PT recommendation for IP rehab.  Patient agreeable and chose, [facility] as first choice , 2nd choice [facility]               Discharge Codes                 Expected Discharge Date and Time     Expected Discharge Date Expected Discharge Time    Dec 19, 2021             Marci Sun RN   Phone communication or documentation only - no physical contact with patient or family.

## 2021-12-17 NOTE — THERAPY TREATMENT NOTE
Subjective: Pt agreeable to therapeutic plan of care.    Objective:     Bed mobility - N/A or Not attempted.  Transfers - Min-A and Mod-A; needs cuing for anterior wt shift; taking 2-3 attempts to stand due to inadequate forward wt shifting w/ coming to stand. Transferred on/off commode.  Ambulation - 80 feet Min-A and with rolling walker; extremely slow anders; wide base of support; holds rw w/ tips of fingers - required hand/over/hand assist to properly grasp rw; very shallow breathing, w/ pt trying to suppress coughing; encouraged deeper breathing and use of heart hugger to brace for coughing.    Vitals: start HR 84, sats 98% 1LO2, RR 20, /50(77); sats dropped to 86% toward end of amb while on 2L;  Ending vitals - HR 89, sats 97% on 1L, RR 21, /59(73)        Cardiac Rehab Initiated  Level 3: AROM Exercises. Ambulation with any level of assistance up to 150 feet.     Sitting tolerance: >10min  Standing tolerance: 5-10min    Precautions:   Mid-sternal incision; avoid scapular retraction and depression.   Cardiovascular impairment post-sx; encourage energy conservation strategies.    Therapeutic Exercises: 10 reps UE AROM in seated position. Facilitation of shoulder retraction to allow for less flexed posture w/o overstretching of incision. Encouraged diaphragmatic breathing.     MET level equivalent: 2.0-3.0 (Unlimited sitting, ambulation on level ground <2mph, light housework)        Pain: 3 VAS  Education: Provided education on importance of mobility and skilled verbal / tactile cueing throughout intervention.     Assessment: Saundra Holder is improving in her ability to ambulate, but still needs assistance. Her breathing is very shallow and she suppresses breathing. She presents with functional mobility impairments which indicate the need for skilled intervention. Tolerating session today without incident. Will continue to follow and progress as tolerated.     Plan/Recommendations:   Pt would benefit  from Inpatient Rehabilitation placement at discharge from facility and requires no DME at discharge.   Pt desires Inpatient Rehabilitation placement at discharge. Pt cooperative; agreeable to therapeutic recommendations and plan of care.     Basic Mobility 6-click:  Rollin = Total, A lot = 2, A little = 3; 4 = None  Supine>Sit:   1 = Total, A lot = 2, A little = 3; 4 = None   Sit>Stand with arms:  1 = Total, A lot = 2, A little = 3; 4 = None  Bed>Chair:   1 = Total, A lot = 2, A little = 3; 4 = None  Ambulate in room:  1 = Total, A lot = 2, A little = 3; 4 = None  3-5 Steps with railin = Total, A lot = 2, A little = 3; 4 = None  Score: 13    Modified Robny: N/A = No pre-op stroke/TIA    Post-Tx Position: Up in Chair, Alarms activated and Call light and personal items within reach  PPE: gloves, surgical mask, eyewear protection

## 2021-12-17 NOTE — PROGRESS NOTES
S/P POD# 3 urgent CABG x4 with LIMA--Celine  EF 55-60% (echo)    Subjective:  No c/o's    No events overnight except for unclear if she had any urine output  Wt down 2 kgs from preop  CXR:  bilat small pleural effusions      Intake/Output Summary (Last 24 hours) at 12/17/2021 0853  Last data filed at 12/17/2021 0601  Gross per 24 hour   Intake 972.1 ml   Output 350 ml   Net 622.1 ml     Temp:  [98.2 °F (36.8 °C)-99 °F (37.2 °C)] 98.2 °F (36.8 °C)  Heart Rate:  [79-96] 86  Resp:  [17-25] 23  BP: ()/(42-62) 112/56      Results from last 7 days   Lab Units 12/17/21  0407 12/16/21  0342 12/15/21  0342 12/15/21  0342 12/14/21  1905 12/14/21  1834 12/14/21  1514 12/14/21  0330   WBC 10*3/mm3 10.90* 11.00*   < > 10.80   < > 11.20*  --  12.20*   HEMOGLOBIN g/dL 8.6* 8.7*   < > 9.8*   < > 9.5*  --  12.4   HEMOGLOBIN, POC   --   --   --   --    < >  --    < >  --    HEMATOCRIT % 25.5* 25.9*   < > 28.6*   < > 28.4*  --  37.3   HEMATOCRIT POC   --   --   --   --    < >  --    < >  --    PLATELETS 10*3/mm3 247 228   < > 260   < > 223  --  353   INR   --   --   --  1.02  --  1.10  --  1.00    < > = values in this interval not displayed.     Results from last 7 days   Lab Units 12/17/21  0407 12/15/21  0852 12/14/21  2305   CREATININE mg/dL 0.58   < > 0.73   POTASSIUM mmol/L 4.6   < > 4.1   SODIUM mmol/L 140   < > 142   MAGNESIUM mg/dL  --   --  2.2   PHOSPHORUS mg/dL  --   --  5.2*    < > = values in this interval not displayed.       Physical Exam:  Neuro intact, nad, up walking with therapy  Tele:  SR 80s  Diminished bases, 95% 2L  Sternotomy/SVHS healing well  Benign abd, no BM, +flatus  No edema    Assessment/Plan:  Active Problems:    CAD, multiple vessel    MV CAD, EF 55-60%--s/p urgent CABG x4 with LIMA (Pagni)  NSTEMI--start Plavix 11/18  HTN--stable  HLD--statin  DM type 2--a1c 7.9, no home meds for DM listed  Asthma--stable  Obesity, stage stage 1--BMI 33.4  Social situation-- just started chemo for throat  cancer and she has a 13 y/o son    POD# 3.  Doing well.  Appears to have some urinary retention, will straight cath if consistently over 250 cc.  On asa/bb/statin.  DC wires.  Start Plavix tomorrow for NSTEMI.  After d/w pt, she hasn't been eating/drinking much. Will give 250 cc NS bolus.  Pt also reports she was on metformin at home.  Will get that added to her home med rec list.  Diabetes educator to see this admission.    Routine care--as above  D/w pt, nsg, PT, Dr. Berger  Anticipate home at discharge    Melody Alvarez, BJ  12/17/2021  08:53 EST

## 2021-12-17 NOTE — DISCHARGE PLACEMENT REQUEST
"Saundra Schilling (51 y.o. Female)             Date of Birth Social Security Number Address Home Phone MRN    [id number]  [id number] [place] [address] [phone] [id number]    Zoroastrianism Marital Status             None        Admission Date Admission Type Admitting Provider Attending Provider Department, Room/Bed    12/13/21 Urgent Samuel Berger MD Pagni, Sebastian, MD [facility], 2205/1    Discharge Date Discharge Disposition Discharge Destination                         Attending Provider: Samuel Berger MD    Allergies: Nitrofuran Derivatives    Isolation: None   Infection: None   Code Status: CPR   Advance Care Planning Activity    Ht: 165.1 cm (65\")   Wt: 91.2 kg (201 lb)    Admission Cmt: None   Principal Problem: None                Active Insurance as of 12/13/2021     Primary Coverage     Payor Plan Insurance Group Employer/Plan Group    ANTHEM BLUE CROSS ANTHEM BLUE CROSS BLUE SHIELD PPO [id number]     Payor Plan Address Payor Plan Phone Number Payor Plan Fax Number Effective Dates    [address] [phone]  1/1/2021 - None Entered    [facility] [id number]       Subscriber Name Subscriber Birth Date Member ID       SAUNDRA SCHILLING 1970 [id number]                 Emergency Contacts      (Rel.) Home Phone Work Phone Mobile Phone    ELISA SCHILLING (Spouse) [phone] -- [phone]    Bairon Schilling (Son) [phone] -- --    Gm Bay (Brother) -- -- [phone]              "

## 2021-12-18 LAB
ANION GAP SERPL CALCULATED.3IONS-SCNC: 12 MMOL/L (ref 5–15)
BH CV ECHO MEAS - BSA(HAYCOCK): 2.1 M^2
BH CV ECHO MEAS - BSA: 2 M^2
BH CV ECHO MEAS - BZI_BMI: 33.4 KILOGRAMS/M^2
BH CV ECHO MEAS - BZI_METRIC_HEIGHT: 165.1 CM
BH CV ECHO MEAS - BZI_METRIC_WEIGHT: 91.2 KG
BUN SERPL-MCNC: 24 MG/DL (ref 6–20)
BUN/CREAT SERPL: 44.4 (ref 7–25)
CALCIUM SPEC-SCNC: 9 MG/DL (ref 8.6–10.5)
CHLORIDE SERPL-SCNC: 104 MMOL/L (ref 98–107)
CO2 SERPL-SCNC: 23 MMOL/L (ref 22–29)
CREAT SERPL-MCNC: 0.54 MG/DL (ref 0.57–1)
DEPRECATED RDW RBC AUTO: 41.6 FL (ref 37–54)
ERYTHROCYTE [DISTWIDTH] IN BLOOD BY AUTOMATED COUNT: 13.5 % (ref 12.3–15.4)
GFR SERPL CREATININE-BSD FRML MDRD: 120 ML/MIN/1.73
GLUCOSE BLDC GLUCOMTR-MCNC: 123 MG/DL (ref 70–105)
GLUCOSE BLDC GLUCOMTR-MCNC: 132 MG/DL (ref 70–105)
GLUCOSE BLDC GLUCOMTR-MCNC: 148 MG/DL (ref 70–105)
GLUCOSE BLDC GLUCOMTR-MCNC: 166 MG/DL (ref 70–105)
GLUCOSE SERPL-MCNC: 135 MG/DL (ref 65–99)
HCT VFR BLD AUTO: 25.2 % (ref 34–46.6)
HGB BLD-MCNC: 8.4 G/DL (ref 12–15.9)
MCH RBC QN AUTO: 29.1 PG (ref 26.6–33)
MCHC RBC AUTO-ENTMCNC: 33.4 G/DL (ref 31.5–35.7)
MCV RBC AUTO: 87.1 FL (ref 79–97)
PLATELET # BLD AUTO: 290 10*3/MM3 (ref 140–450)
PMV BLD AUTO: 7.2 FL (ref 6–12)
POTASSIUM SERPL-SCNC: 4.1 MMOL/L (ref 3.5–5.2)
RBC # BLD AUTO: 2.89 10*6/MM3 (ref 3.77–5.28)
SODIUM SERPL-SCNC: 139 MMOL/L (ref 136–145)
WBC NRBC COR # BLD: 9.8 10*3/MM3 (ref 3.4–10.8)

## 2021-12-18 PROCEDURE — 25010000002 ENOXAPARIN PER 10 MG: Performed by: NURSE PRACTITIONER

## 2021-12-18 PROCEDURE — 85027 COMPLETE CBC AUTOMATED: CPT | Performed by: THORACIC SURGERY (CARDIOTHORACIC VASCULAR SURGERY)

## 2021-12-18 PROCEDURE — 94799 UNLISTED PULMONARY SVC/PX: CPT

## 2021-12-18 PROCEDURE — 63710000001 INSULIN LISPRO (HUMAN) PER 5 UNITS: Performed by: NURSE PRACTITIONER

## 2021-12-18 PROCEDURE — 99024 POSTOP FOLLOW-UP VISIT: CPT | Performed by: THORACIC SURGERY (CARDIOTHORACIC VASCULAR SURGERY)

## 2021-12-18 PROCEDURE — 80048 BASIC METABOLIC PNL TOTAL CA: CPT | Performed by: THORACIC SURGERY (CARDIOTHORACIC VASCULAR SURGERY)

## 2021-12-18 PROCEDURE — 82962 GLUCOSE BLOOD TEST: CPT

## 2021-12-18 RX ORDER — LACTULOSE 10 G/15ML
20 SOLUTION ORAL ONCE
Status: COMPLETED | OUTPATIENT
Start: 2021-12-18 | End: 2021-12-18

## 2021-12-18 RX ORDER — FERROUS SULFATE TAB EC 324 MG (65 MG FE EQUIVALENT) 324 (65 FE) MG
324 TABLET DELAYED RESPONSE ORAL
Status: DISCONTINUED | OUTPATIENT
Start: 2021-12-18 | End: 2021-12-21 | Stop reason: HOSPADM

## 2021-12-18 RX ADMIN — ASPIRIN 81 MG: 81 TABLET, COATED ORAL at 08:04

## 2021-12-18 RX ADMIN — HYDROCODONE BITARTRATE AND ACETAMINOPHEN 1 TABLET: 5; 325 TABLET ORAL at 02:02

## 2021-12-18 RX ADMIN — INSULIN LISPRO 2 UNITS: 100 INJECTION, SOLUTION INTRAVENOUS; SUBCUTANEOUS at 08:04

## 2021-12-18 RX ADMIN — LACTULOSE 20 G: 20 SOLUTION ORAL at 12:48

## 2021-12-18 RX ADMIN — ENOXAPARIN SODIUM 40 MG: 40 INJECTION SUBCUTANEOUS at 17:15

## 2021-12-18 RX ADMIN — Medication 10 ML: at 08:05

## 2021-12-18 RX ADMIN — ATORVASTATIN CALCIUM 40 MG: 40 TABLET, FILM COATED ORAL at 20:24

## 2021-12-18 RX ADMIN — POLYETHYLENE GLYCOL 3350 17 G: 17 POWDER, FOR SOLUTION ORAL at 08:05

## 2021-12-18 RX ADMIN — Medication 10 ML: at 20:26

## 2021-12-18 RX ADMIN — DOCUSATE SODIUM 50 MG AND SENNOSIDES 8.6 MG 2 TABLET: 8.6; 5 TABLET, FILM COATED ORAL at 08:04

## 2021-12-18 RX ADMIN — FERROUS SULFATE TAB EC 324 MG (65 MG FE EQUIVALENT) 324 MG: 324 (65 FE) TABLET DELAYED RESPONSE at 12:48

## 2021-12-18 RX ADMIN — METOPROLOL TARTRATE 12.5 MG: 25 TABLET, FILM COATED ORAL at 08:04

## 2021-12-18 RX ADMIN — FUROSEMIDE 20 MG: 20 TABLET ORAL at 08:05

## 2021-12-18 RX ADMIN — CHLORHEXIDINE GLUCONATE 15 ML: 1.2 RINSE ORAL at 08:05

## 2021-12-18 RX ADMIN — CLOPIDOGREL BISULFATE 75 MG: 75 TABLET ORAL at 08:05

## 2021-12-18 RX ADMIN — METFORMIN HYDROCHLORIDE 500 MG: 500 TABLET ORAL at 13:02

## 2021-12-18 RX ADMIN — METOPROLOL TARTRATE 12.5 MG: 25 TABLET, FILM COATED ORAL at 20:23

## 2021-12-18 NOTE — PLAN OF CARE
Goal Outcome Evaluation:      Pt is now POD #4. Borderline urine output still. Will have to bladder scan to see if she is retaining urine. Vital signs have remained stable, requiring only 1 liter for personal comfort. Ambulates well, but pt is reluctant to do things without staff help. Chest pain has been adequately controlled. Incision is healing well. She denies any further needs at this time. Will continue to monitor.

## 2021-12-18 NOTE — PLAN OF CARE
Goal Outcome Evaluation:  Plan of Care Reviewed With: patient        Progress: improving  Outcome Summary: Patient on room air and tolerating. Patient became tearfull today and not wanting to participate in much activity.

## 2021-12-18 NOTE — PROGRESS NOTES
S/P POD# 4 urgent CABG x4 with LIMA--Celine  EF 55-60% (echo)    Subjective:  No c/o's, flat affect    No events overnight   Urine output picked up after 250 cc NS bolus  Wt down 3 kgs from preop      Intake/Output Summary (Last 24 hours) at 12/18/2021 0930  Last data filed at 12/18/2021 0600  Gross per 24 hour   Intake 480 ml   Output 2225 ml   Net -1745 ml     Temp:  [97.7 °F (36.5 °C)-99.2 °F (37.3 °C)] 98.7 °F (37.1 °C)  Heart Rate:  [78-92] 82  Resp:  [22-29] 29  BP: ()/(46-83) 113/62      Results from last 7 days   Lab Units 12/18/21  0440 12/17/21  0407 12/16/21  0342 12/15/21  0342 12/14/21  1905 12/14/21  1834 12/14/21  1514 12/14/21  0330   WBC 10*3/mm3 9.80 10.90*   < > 10.80   < > 11.20*  --  12.20*   HEMOGLOBIN g/dL 8.4* 8.6*   < > 9.8*   < > 9.5*  --  12.4   HEMOGLOBIN, POC   --   --   --   --    < >  --    < >  --    HEMATOCRIT % 25.2* 25.5*   < > 28.6*   < > 28.4*  --  37.3   HEMATOCRIT POC   --   --   --   --    < >  --    < >  --    PLATELETS 10*3/mm3 290 247   < > 260   < > 223  --  353   INR   --   --   --  1.02  --  1.10  --  1.00    < > = values in this interval not displayed.     Results from last 7 days   Lab Units 12/18/21  0440 12/15/21  0852 12/14/21  2305   CREATININE mg/dL 0.54*   < > 0.73   POTASSIUM mmol/L 4.1   < > 4.1   SODIUM mmol/L 139   < > 142   MAGNESIUM mg/dL  --   --  2.2   PHOSPHORUS mg/dL  --   --  5.2*    < > = values in this interval not displayed.       Physical Exam:  Neuro intact, nad, up in chair  Tele:  SR 80s  Diminished bases, 95% 2L  Sternotomy/SVHS healing well  Benign abd, no BM, +flatus  No edema    Assessment/Plan:  Active Problems:    CAD, multiple vessel    MV CAD, EF 55-60%--s/p urgent CABG x4 with LIMA (Pagni)  NSTEMI--start Plavix 11/18  HTN--stable  HLD--statin  DM type 2--a1c 7.9, no home meds for DM listed  Asthma--stable  Obesity, stage stage 1--BMI 33.4  Social situation-- just started chemo for throat cancer and she has a 13 y/o  son  Postop ABLA, expected--add iron, no transfusion required    POD# 4.  Doing better.  On asa/bb/statin.  Plavix today for NSTEMI.  Need to aggressively mobilize pt.  Add iron for anemia.  Lactulose x1 d/t constipation.    Routine care--as above  D/w pt, nsg, Dr. Berger  Anticipate rehab at discharge     Melody Alvarez, BJ  12/18/2021  09:30 EST

## 2021-12-19 LAB
ANION GAP SERPL CALCULATED.3IONS-SCNC: 14 MMOL/L (ref 5–15)
BUN SERPL-MCNC: 18 MG/DL (ref 6–20)
BUN/CREAT SERPL: 38.3 (ref 7–25)
CALCIUM SPEC-SCNC: 9.6 MG/DL (ref 8.6–10.5)
CHLORIDE SERPL-SCNC: 102 MMOL/L (ref 98–107)
CO2 SERPL-SCNC: 24 MMOL/L (ref 22–29)
CREAT SERPL-MCNC: 0.47 MG/DL (ref 0.57–1)
DEPRECATED RDW RBC AUTO: 39.4 FL (ref 37–54)
ERYTHROCYTE [DISTWIDTH] IN BLOOD BY AUTOMATED COUNT: 13.1 % (ref 12.3–15.4)
GFR SERPL CREATININE-BSD FRML MDRD: 141 ML/MIN/1.73
GLUCOSE BLDC GLUCOMTR-MCNC: 101 MG/DL (ref 70–105)
GLUCOSE BLDC GLUCOMTR-MCNC: 171 MG/DL (ref 70–105)
GLUCOSE BLDC GLUCOMTR-MCNC: 190 MG/DL (ref 70–105)
GLUCOSE BLDC GLUCOMTR-MCNC: 193 MG/DL (ref 70–105)
GLUCOSE SERPL-MCNC: 121 MG/DL (ref 65–99)
HCT VFR BLD AUTO: 29 % (ref 34–46.6)
HGB BLD-MCNC: 9.8 G/DL (ref 12–15.9)
MCH RBC QN AUTO: 28.6 PG (ref 26.6–33)
MCHC RBC AUTO-ENTMCNC: 33.7 G/DL (ref 31.5–35.7)
MCV RBC AUTO: 84.7 FL (ref 79–97)
PLATELET # BLD AUTO: 422 10*3/MM3 (ref 140–450)
PMV BLD AUTO: 6.3 FL (ref 6–12)
POTASSIUM SERPL-SCNC: 4 MMOL/L (ref 3.5–5.2)
RBC # BLD AUTO: 3.43 10*6/MM3 (ref 3.77–5.28)
SODIUM SERPL-SCNC: 140 MMOL/L (ref 136–145)
WBC NRBC COR # BLD: 8.2 10*3/MM3 (ref 3.4–10.8)

## 2021-12-19 PROCEDURE — 80048 BASIC METABOLIC PNL TOTAL CA: CPT | Performed by: THORACIC SURGERY (CARDIOTHORACIC VASCULAR SURGERY)

## 2021-12-19 PROCEDURE — 99024 POSTOP FOLLOW-UP VISIT: CPT | Performed by: THORACIC SURGERY (CARDIOTHORACIC VASCULAR SURGERY)

## 2021-12-19 PROCEDURE — 85027 COMPLETE CBC AUTOMATED: CPT | Performed by: NURSE PRACTITIONER

## 2021-12-19 PROCEDURE — 25010000002 ENOXAPARIN PER 10 MG: Performed by: NURSE PRACTITIONER

## 2021-12-19 PROCEDURE — 82962 GLUCOSE BLOOD TEST: CPT

## 2021-12-19 PROCEDURE — 25010000002 HYDRALAZINE PER 20 MG: Performed by: THORACIC SURGERY (CARDIOTHORACIC VASCULAR SURGERY)

## 2021-12-19 RX ORDER — HYDRALAZINE HYDROCHLORIDE 20 MG/ML
20 INJECTION INTRAMUSCULAR; INTRAVENOUS EVERY 4 HOURS PRN
Status: DISCONTINUED | OUTPATIENT
Start: 2021-12-19 | End: 2021-12-21 | Stop reason: HOSPADM

## 2021-12-19 RX ADMIN — ACETAMINOPHEN 650 MG: 325 TABLET, FILM COATED ORAL at 12:15

## 2021-12-19 RX ADMIN — CHLORHEXIDINE GLUCONATE 15 ML: 1.2 RINSE ORAL at 08:08

## 2021-12-19 RX ADMIN — Medication 10 ML: at 08:12

## 2021-12-19 RX ADMIN — CLOPIDOGREL BISULFATE 75 MG: 75 TABLET ORAL at 08:09

## 2021-12-19 RX ADMIN — HYDRALAZINE HYDROCHLORIDE 20 MG: 20 INJECTION INTRAMUSCULAR; INTRAVENOUS at 22:54

## 2021-12-19 RX ADMIN — HYDROCODONE BITARTRATE AND ACETAMINOPHEN 1 TABLET: 5; 325 TABLET ORAL at 23:14

## 2021-12-19 RX ADMIN — METOPROLOL TARTRATE 12.5 MG: 25 TABLET, FILM COATED ORAL at 08:09

## 2021-12-19 RX ADMIN — FERROUS SULFATE TAB EC 324 MG (65 MG FE EQUIVALENT) 324 MG: 324 (65 FE) TABLET DELAYED RESPONSE at 08:09

## 2021-12-19 RX ADMIN — Medication 10 ML: at 20:02

## 2021-12-19 RX ADMIN — ACETAMINOPHEN 650 MG: 325 TABLET, FILM COATED ORAL at 06:21

## 2021-12-19 RX ADMIN — ASPIRIN 81 MG: 81 TABLET, COATED ORAL at 08:09

## 2021-12-19 RX ADMIN — ATORVASTATIN CALCIUM 40 MG: 40 TABLET, FILM COATED ORAL at 20:03

## 2021-12-19 RX ADMIN — METFORMIN HYDROCHLORIDE 500 MG: 500 TABLET ORAL at 08:09

## 2021-12-19 RX ADMIN — METOPROLOL TARTRATE 12.5 MG: 25 TABLET, FILM COATED ORAL at 20:02

## 2021-12-19 RX ADMIN — ENOXAPARIN SODIUM 40 MG: 40 INJECTION SUBCUTANEOUS at 18:07

## 2021-12-19 RX ADMIN — FUROSEMIDE 20 MG: 20 TABLET ORAL at 08:09

## 2021-12-19 NOTE — PLAN OF CARE
Goal Outcome Evaluation:            Pt is now POD #5. She has been on room air for the entire night. Borderline tachycardic and slightly hypertensive. Bowel regiment held per pt request because she had a bowel movement with lactulose during the day. She has had a weak cough and uses good sternal precautions. Still a very flat affect. She denies any other needs at this time. Will continue to monitor.

## 2021-12-19 NOTE — PROGRESS NOTES
S/P POD# 5 urgent CABG x4 with LIMA--Celine  EF 55-60% (echo)    Subjective:  No c/o's, engaged with conversation today    No events overnight   Wt down 3 kgs from preop      Intake/Output Summary (Last 24 hours) at 12/19/2021 0849  Last data filed at 12/19/2021 0610  Gross per 24 hour   Intake 240 ml   Output 1600 ml   Net -1360 ml     Temp:  [98.2 °F (36.8 °C)-100 °F (37.8 °C)] 99.1 °F (37.3 °C)  Heart Rate:  [82-97] 92  Resp:  [19-33] 23  BP: (106-154)/() 154/58      Results from last 7 days   Lab Units 12/18/21  0440 12/17/21  0407 12/16/21  0342 12/15/21  0342 12/14/21  1905 12/14/21  1834 12/14/21  1514 12/14/21  0330   WBC 10*3/mm3 9.80 10.90*   < > 10.80   < > 11.20*  --  12.20*   HEMOGLOBIN g/dL 8.4* 8.6*   < > 9.8*   < > 9.5*  --  12.4   HEMOGLOBIN, POC   --   --   --   --    < >  --    < >  --    HEMATOCRIT % 25.2* 25.5*   < > 28.6*   < > 28.4*  --  37.3   HEMATOCRIT POC   --   --   --   --    < >  --    < >  --    PLATELETS 10*3/mm3 290 247   < > 260   < > 223  --  353   INR   --   --   --  1.02  --  1.10  --  1.00    < > = values in this interval not displayed.     Results from last 7 days   Lab Units 12/19/21  0408 12/15/21  0852 12/14/21  2305   CREATININE mg/dL 0.47*   < > 0.73   POTASSIUM mmol/L 4.0   < > 4.1   SODIUM mmol/L 140   < > 142   MAGNESIUM mg/dL  --   --  2.2   PHOSPHORUS mg/dL  --   --  5.2*    < > = values in this interval not displayed.       Physical Exam:  Neuro intact, nad, up in chair  Tele:  SR 80s  Diminished bases, 92% RA  Sternotomy/SVHS healing well  Benign abd, + BM  No edema    Assessment/Plan:  Active Problems:    CAD, multiple vessel    MV CAD, EF 55-60%--s/p urgent CABG x4 with LIMA (Pagni)  NSTEMI--start Plavix 11/18  HTN--stable  HLD--statin  DM type 2--a1c 7.9, no home meds for DM listed  Asthma--stable  Obesity, stage stage 1--BMI 33.4  Social situation-- just started chemo for throat cancer and she has a 15 y/o son  Postop ABLA, expected--add iron, no  transfusion required    POD# 5.  Doing better.  On asa/bb/statin.  Plavix for NSTEMI.  Need to aggressively mobilize pt.  Add iron for anemia. Constipation resolved with Lactulose.    Routine care--as above  D/w pt, nsg, Dr. Berger  Anticipate rehab at discharge     Melody Alvarez, BJ  12/19/2021  08:49 EST

## 2021-12-20 LAB
ANION GAP SERPL CALCULATED.3IONS-SCNC: 13 MMOL/L (ref 5–15)
BUN SERPL-MCNC: 13 MG/DL (ref 6–20)
BUN/CREAT SERPL: 30.2 (ref 7–25)
CALCIUM SPEC-SCNC: 9.5 MG/DL (ref 8.6–10.5)
CHLORIDE SERPL-SCNC: 101 MMOL/L (ref 98–107)
CO2 SERPL-SCNC: 25 MMOL/L (ref 22–29)
CREAT SERPL-MCNC: 0.43 MG/DL (ref 0.57–1)
DEPRECATED RDW RBC AUTO: 39.4 FL (ref 37–54)
ERYTHROCYTE [DISTWIDTH] IN BLOOD BY AUTOMATED COUNT: 13.2 % (ref 12.3–15.4)
GFR SERPL CREATININE-BSD FRML MDRD: >150 ML/MIN/1.73
GLUCOSE BLDC GLUCOMTR-MCNC: 120 MG/DL (ref 70–105)
GLUCOSE BLDC GLUCOMTR-MCNC: 140 MG/DL (ref 70–105)
GLUCOSE BLDC GLUCOMTR-MCNC: 184 MG/DL (ref 70–105)
GLUCOSE BLDC GLUCOMTR-MCNC: 196 MG/DL (ref 70–105)
GLUCOSE SERPL-MCNC: 132 MG/DL (ref 65–99)
HCT VFR BLD AUTO: 29.3 % (ref 34–46.6)
HGB BLD-MCNC: 9.9 G/DL (ref 12–15.9)
MCH RBC QN AUTO: 28.6 PG (ref 26.6–33)
MCHC RBC AUTO-ENTMCNC: 33.8 G/DL (ref 31.5–35.7)
MCV RBC AUTO: 84.7 FL (ref 79–97)
PLATELET # BLD AUTO: 503 10*3/MM3 (ref 140–450)
PMV BLD AUTO: 6.5 FL (ref 6–12)
POTASSIUM SERPL-SCNC: 3.5 MMOL/L (ref 3.5–5.2)
POTASSIUM SERPL-SCNC: 4.3 MMOL/L (ref 3.5–5.2)
RBC # BLD AUTO: 3.47 10*6/MM3 (ref 3.77–5.28)
SODIUM SERPL-SCNC: 139 MMOL/L (ref 136–145)
WBC NRBC COR # BLD: 10 10*3/MM3 (ref 3.4–10.8)

## 2021-12-20 PROCEDURE — 25010000002 ENOXAPARIN PER 10 MG: Performed by: NURSE PRACTITIONER

## 2021-12-20 PROCEDURE — 97530 THERAPEUTIC ACTIVITIES: CPT

## 2021-12-20 PROCEDURE — 82962 GLUCOSE BLOOD TEST: CPT

## 2021-12-20 PROCEDURE — 63710000001 INSULIN LISPRO (HUMAN) PER 5 UNITS: Performed by: NURSE PRACTITIONER

## 2021-12-20 PROCEDURE — 97535 SELF CARE MNGMENT TRAINING: CPT

## 2021-12-20 PROCEDURE — 25010000002 HYDRALAZINE PER 20 MG: Performed by: THORACIC SURGERY (CARDIOTHORACIC VASCULAR SURGERY)

## 2021-12-20 PROCEDURE — 84132 ASSAY OF SERUM POTASSIUM: CPT | Performed by: NURSE PRACTITIONER

## 2021-12-20 PROCEDURE — 85027 COMPLETE CBC AUTOMATED: CPT | Performed by: NURSE PRACTITIONER

## 2021-12-20 PROCEDURE — 99024 POSTOP FOLLOW-UP VISIT: CPT | Performed by: THORACIC SURGERY (CARDIOTHORACIC VASCULAR SURGERY)

## 2021-12-20 PROCEDURE — 25010000002 MAGNESIUM SULFATE IN D5W 1G/100ML (PREMIX) 1-5 GM/100ML-% SOLUTION: Performed by: NURSE PRACTITIONER

## 2021-12-20 PROCEDURE — 97110 THERAPEUTIC EXERCISES: CPT

## 2021-12-20 PROCEDURE — 80048 BASIC METABOLIC PNL TOTAL CA: CPT | Performed by: THORACIC SURGERY (CARDIOTHORACIC VASCULAR SURGERY)

## 2021-12-20 RX ORDER — POTASSIUM CHLORIDE 20 MEQ/1
40 TABLET, EXTENDED RELEASE ORAL
Status: COMPLETED | OUTPATIENT
Start: 2021-12-20 | End: 2021-12-20

## 2021-12-20 RX ORDER — MAGNESIUM SULFATE 1 G/100ML
1 INJECTION INTRAVENOUS ONCE
Status: COMPLETED | OUTPATIENT
Start: 2021-12-20 | End: 2021-12-20

## 2021-12-20 RX ADMIN — METOPROLOL TARTRATE 25 MG: 25 TABLET, FILM COATED ORAL at 21:10

## 2021-12-20 RX ADMIN — CHLORHEXIDINE GLUCONATE 15 ML: 1.2 RINSE ORAL at 08:18

## 2021-12-20 RX ADMIN — DOCUSATE SODIUM 50 MG AND SENNOSIDES 8.6 MG 2 TABLET: 8.6; 5 TABLET, FILM COATED ORAL at 08:18

## 2021-12-20 RX ADMIN — METOPROLOL TARTRATE 25 MG: 25 TABLET, FILM COATED ORAL at 15:13

## 2021-12-20 RX ADMIN — ASPIRIN 81 MG: 81 TABLET, COATED ORAL at 08:19

## 2021-12-20 RX ADMIN — METOPROLOL TARTRATE 12.5 MG: 25 TABLET, FILM COATED ORAL at 08:19

## 2021-12-20 RX ADMIN — FERROUS SULFATE TAB EC 324 MG (65 MG FE EQUIVALENT) 324 MG: 324 (65 FE) TABLET DELAYED RESPONSE at 08:19

## 2021-12-20 RX ADMIN — METFORMIN HYDROCHLORIDE 500 MG: 500 TABLET ORAL at 08:19

## 2021-12-20 RX ADMIN — POTASSIUM CHLORIDE 40 MEQ: 1500 TABLET, EXTENDED RELEASE ORAL at 10:48

## 2021-12-20 RX ADMIN — CHLORHEXIDINE GLUCONATE 15 ML: 1.2 RINSE ORAL at 21:14

## 2021-12-20 RX ADMIN — ACETAMINOPHEN 650 MG: 325 TABLET, FILM COATED ORAL at 19:20

## 2021-12-20 RX ADMIN — FUROSEMIDE 20 MG: 20 TABLET ORAL at 08:19

## 2021-12-20 RX ADMIN — CLOPIDOGREL BISULFATE 75 MG: 75 TABLET ORAL at 08:18

## 2021-12-20 RX ADMIN — ATORVASTATIN CALCIUM 40 MG: 40 TABLET, FILM COATED ORAL at 21:10

## 2021-12-20 RX ADMIN — MAGNESIUM SULFATE HEPTAHYDRATE 1 G: 1 INJECTION, SOLUTION INTRAVENOUS at 10:48

## 2021-12-20 RX ADMIN — Medication 10 ML: at 08:19

## 2021-12-20 RX ADMIN — POTASSIUM CHLORIDE 40 MEQ: 1500 TABLET, EXTENDED RELEASE ORAL at 12:41

## 2021-12-20 RX ADMIN — INSULIN LISPRO 2 UNITS: 100 INJECTION, SOLUTION INTRAVENOUS; SUBCUTANEOUS at 16:49

## 2021-12-20 RX ADMIN — Medication 10 ML: at 21:09

## 2021-12-20 RX ADMIN — ACETAMINOPHEN 650 MG: 325 TABLET, FILM COATED ORAL at 06:21

## 2021-12-20 RX ADMIN — ENOXAPARIN SODIUM 40 MG: 40 INJECTION SUBCUTANEOUS at 15:13

## 2021-12-20 RX ADMIN — POLYETHYLENE GLYCOL 3350 17 G: 17 POWDER, FOR SOLUTION ORAL at 08:18

## 2021-12-20 RX ADMIN — HYDRALAZINE HYDROCHLORIDE 20 MG: 20 INJECTION INTRAMUSCULAR; INTRAVENOUS at 03:27

## 2021-12-20 NOTE — PLAN OF CARE
Goal Outcome Evaluation:   Patient able to take multiple walks in land[place] today. Moving well. Awaiting rehab placement. No drips. Vitals stable. Sinus rhythm.

## 2021-12-20 NOTE — PROGRESS NOTES
" LOS: 7 days   Patient Care Team:  Treasure Dickinson APRN as PCP - General (Nurse Practitioner)    Chief Complaint: post op fu    Subjective:  Symptoms:  No shortness of breath or chest pain.    Diet:  No nausea.    Activity level: Returning to normal.    Pain:  She reports no pain.          Vital Signs  Temp:  [98 °F (36.7 °C)-100 °F (37.8 °C)] 98 °F (36.7 °C)  Heart Rate:  [] 89  Resp:  [15-25] 21  BP: (131-176)/(55-84) 151/55  Body mass index is 32.98 kg/m².    Intake/Output Summary (Last 24 hours) at 12/20/2021 1029  Last data filed at 12/20/2021 1000  Gross per 24 hour   Intake 480 ml   Output 1200 ml   Net -720 ml     I/O this shift:  In: -   Out: 400 [Urine:400]          12/17/21  1359 12/18/21  0639 12/19/21  0610   Weight: 91.2 kg (201 lb) 90.9 kg (200 lb 6.4 oz) 89.9 kg (198 lb 3.1 oz)         Objective:  General Appearance:  Comfortable.    Vital signs: (most recent): Blood pressure 151/55, pulse 89, temperature 98 °F (36.7 °C), temperature source Oral, resp. rate 21, height 165.1 cm (65\"), weight 89.9 kg (198 lb 3.1 oz), SpO2 95 %, not currently breastfeeding.    Output: Producing urine and producing stool.    Lungs:  Normal effort and normal respiratory rate.  Breath sounds clear to auscultation.    Heart: Normal rate.  Regular rhythm.  S1 normal and S2 normal.    Extremities: There is no dependent edema.    Neurological: Patient is alert and oriented to person, place and time.    Skin:  Warm and dry.  (Sternal and leg incisions well approximated without erythema, edema, or drainage)            Results Review:        WBC WBC   Date Value Ref Range Status   12/20/2021 10.00 3.40 - 10.80 10*3/mm3 Final   12/19/2021 8.20 3.40 - 10.80 10*3/mm3 Final   12/18/2021 9.80 3.40 - 10.80 10*3/mm3 Final      HGB Hemoglobin   Date Value Ref Range Status   12/20/2021 9.9 (L) 12.0 - 15.9 g/dL Final   12/19/2021 9.8 (L) 12.0 - 15.9 g/dL Final   12/18/2021 8.4 (L) 12.0 - 15.9 g/dL Final      HCT Hematocrit   Date " Value Ref Range Status   12/20/2021 29.3 (L) 34.0 - 46.6 % Final   12/19/2021 29.0 (L) 34.0 - 46.6 % Final   12/18/2021 25.2 (L) 34.0 - 46.6 % Final      Platelets Platelets   Date Value Ref Range Status   12/20/2021 503 (H) 140 - 450 10*3/mm3 Final   12/19/2021 422 140 - 450 10*3/mm3 Final   12/18/2021 290 140 - 450 10*3/mm3 Final        PT/INR:  No results found for: PROTIME/No results found for: INR    Sodium Sodium   Date Value Ref Range Status   12/20/2021 139 136 - 145 mmol/L Final   12/19/2021 140 136 - 145 mmol/L Final   12/18/2021 139 136 - 145 mmol/L Final      Potassium Potassium   Date Value Ref Range Status   12/20/2021 3.5 3.5 - 5.2 mmol/L Final   12/19/2021 4.0 3.5 - 5.2 mmol/L Final   12/18/2021 4.1 3.5 - 5.2 mmol/L Final      Chloride Chloride   Date Value Ref Range Status   12/20/2021 101 98 - 107 mmol/L Final   12/19/2021 102 98 - 107 mmol/L Final   12/18/2021 104 98 - 107 mmol/L Final      Bicarbonate CO2   Date Value Ref Range Status   12/20/2021 25.0 22.0 - 29.0 mmol/L Final   12/19/2021 24.0 22.0 - 29.0 mmol/L Final   12/18/2021 23.0 22.0 - 29.0 mmol/L Final      BUN BUN   Date Value Ref Range Status   12/20/2021 13 6 - 20 mg/dL Final   12/19/2021 18 6 - 20 mg/dL Final   12/18/2021 24 (H) 6 - 20 mg/dL Final      Creatinine Creatinine   Date Value Ref Range Status   12/20/2021 0.43 (L) 0.57 - 1.00 mg/dL Final   12/19/2021 0.47 (L) 0.57 - 1.00 mg/dL Final   12/18/2021 0.54 (L) 0.57 - 1.00 mg/dL Final      Calcium Calcium   Date Value Ref Range Status   12/20/2021 9.5 8.6 - 10.5 mg/dL Final   12/19/2021 9.6 8.6 - 10.5 mg/dL Final   12/18/2021 9.0 8.6 - 10.5 mg/dL Final      Magnesium No results found for: MG   Troponin No results found for: TROPONIN   BNP No results found for: BNP         aspirin, 81 mg, Oral, Daily  atorvastatin, 40 mg, Oral, Nightly  chlorhexidine, 15 mL, Mouth/Throat, Q12H  clopidogrel, 75 mg, Oral, Daily  enoxaparin, 40 mg, Subcutaneous, Daily  ferrous sulfate, 324 mg, Oral,  Daily With Breakfast  furosemide, 20 mg, Oral, Daily  insulin lispro, 0-9 Units, Subcutaneous, TID AC  metFORMIN, 500 mg, Oral, Daily With Breakfast  metoprolol tartrate, 12.5 mg, Oral, Q12H  mupirocin, , Each Nare, BID  polyethylene glycol, 17 g, Oral, BID  senna-docusate sodium, 2 tablet, Oral, BID  sodium chloride, 10 mL, Intravenous, Q12H           PRN Meds:.•  acetaminophen **OR** acetaminophen **OR** acetaminophen  •  bisacodyl  •  bisacodyl  •  cyclobenzaprine  •  dextrose  •  dextrose  •  glucagon (human recombinant)  •  hydrALAZINE  •  HYDROcodone-acetaminophen  •  insulin lispro **AND** insulin lispro  •  magnesium hydroxide  •  ondansetron  •  sodium chloride  •  sodium chloride    Patient Active Problem List   Diagnosis Code   • CAD, multiple vessel I25.10       Assessment & Plan    -MV CAD, EF 55-60%--s/p urgent CABG x4 with LIMA----POD #6 (Pagni)  -NSTEMI--start Plavix 11/18  -HTN--stable  -HLD--statin  -DM type 2--a1c 7.9, no home meds for DM listed  -Asthma--stable  -Obesity, stage stage 1--BMI 33.4  -Social situation-- just started chemo for throat cancer and she has a 13 y/o son  -Postop ABLA, expected--add iron, no transfusion required    Short run of AF aberrency, 11 beats, this am, increase beta blocker and give 1 gm Magnesium.  Plan for rehab 1-2 days.     Dinorah Orellana, APRN  12/20/21  10:29 EST

## 2021-12-20 NOTE — PLAN OF CARE
Saundra Holder presents with ADL impairments below baseline abilities which indicate the need for continued skilled intervention while inpatient. Pt on commode upon OT arrival. Requests to return to bed. Toileting with mod A. Grooming at sink with min A / CGA for balance/safety. Reviewed HEP, and pt demos good understanding. Ambulates with RW throughout room. Min-Mod A x2 for supine>sit. Tolerating session today without incident. Will continue to follow and progress as tolerated.

## 2021-12-20 NOTE — PLAN OF CARE
Objective:   Bed mobility - Min-A for return to supine  Transfers - Min-A  Ambulation - 90 feet CGA    Assessment: Saundra Holder presents with functional mobility impairments which indicate the need for skilled intervention. Tolerating session today without incident. Pt with limited activity tolerance this date reports being fatigued due to lack of sleep. Pt ambulating in landrum with CGA with RWx but ambulates slowly. Pt requires MIN A for sit to supine to assist with BLE for positioning. Will continue to follow and progress as tolerated.

## 2021-12-20 NOTE — CASE MANAGEMENT/SOCIAL WORK
Continued Stay Note   Nick     Patient Name: Saundra Holder  MRN: [id number]  Today's Date: 12/20/2021    Admit Date: 12/13/2021     Discharge Plan     Row Name 12/20/21 1712       Plan    Plan Referral to [facility] pending acceptance [facility] per facility    Plan Comments Referral to [facility] pending acceptance. [facility]RR per facility. Denies at [facility] and no subcute bed available at [facility]                            Expected Discharge Date and Time     Expected Discharge Date Expected Discharge Time    Dec 19, 2021             Marci Sun RN   Met with patient in room wearing PPE: mask, face shield/goggles, gloves, gown.      Maintained distance greater than six feet and spent less than 15 minutes in the room.

## 2021-12-20 NOTE — PLAN OF CARE
Goal Outcome Evaluation:               Pt is now POD #6. Pt very hypertensive at start of shift. Notified on-call provider (Celine) and he ordered q4 PRN hydralazine IV to maintain SBP <150. This has had to be given twice throughout the night. She also had an 11 beat run of Vtach. Pt complained of some chest discomfort/tightness that improved with pain medication. Overall she has been in a more pleasant mood compared to other nights. Remains on room air and borderline tachycardic. Will continue to monitor.

## 2021-12-20 NOTE — THERAPY TREATMENT NOTE
Subjective: Pt agreeable to therapeutic plan of care.  Cognition: oriented to Person, Place, Time and Situation. Pt oriented to 3/3 sternal precautions.     Objective:     Bed Mobility: Mod-A and Assist x 2  Functional Transfers: Min-A  Functional Ambulation: Min-A and Assist x 2    Toileting: Mod-A  ADL Position: unsupported sitting  ADL Comments: sitting on commode    Grooming: Min-A  ADL Position: supported standing  ADL Comments: standing at sink    Pain: 3 VAS  Education: Provided education on importance of mobility and skilled verbal / tactile cueing throughout intervention.     Assessment: Saundra Holder presents with ADL impairments below baseline abilities which indicate the need for continued skilled intervention while inpatient. Pt on commode upon OT arrival. Requests to return to bed. Toileting with mod A. Grooming at sink with min A / CGA for balance/safety. Reviewed HEP, and pt demos good understanding. Ambulates with RW throughout room. Min-Mod A x2 for supine>sit. Tolerating session today without incident. Will continue to follow and progress as tolerated.     Plan/Recommendations:   Pt would benefit from Inpatient Rehabilitation placement at discharge from facility.   Pt desires Inpatient Rehabilitation placement at discharge. Pt cooperative; agreeable to therapeutic recommendations and plan of care.     Post-Tx Position: Supine with HOB Elevated, Alarms activated and Call light and personal items within reach  PPE: gloves, surgical mask, eyewear protection

## 2021-12-20 NOTE — CONSULTS
Cardiac rehab evaluation s/p CABG. Patient sitting up in chair. Brochure, post op activity list given. Explained program and benefits. Verified phone number. Will call after discharge. Thank you.

## 2021-12-20 NOTE — THERAPY TREATMENT NOTE
Subjective: Pt agreeable to therapeutic plan of care.    Cardiac Rehab Initiated  Level 3: AROM Exercises. Ambulation with any level of assistance up to 150 feet.     Sitting tolerance: unsupported  Standing tolerance: 5-10min    Precautions:   Mid-sternal incision; avoid scapular retraction and depression.   Cardiovascular impairment post-sx; encourage energy conservation strategies.    Therapeutic Exercises: 10 reps UE and LE AROM in seated position.     MET level equivalent: 2.0-3.0 (Unlimited sitting, ambulation on level ground <2mph, light housework)      Objective:     Bed mobility - Min-A for return to supine  Transfers - Min-A  Ambulation - 90 feet CGA    Pain: 3 VAS  Education: Provided education on importance of mobility and skilled verbal / tactile cueing throughout intervention.     Assessment: Saundra Holder presents with functional mobility impairments which indicate the need for skilled intervention. Tolerating session today without incident. Pt with limited activity tolerance this date reports being fatigued due to lack of sleep. Pt ambulating in landrum with CGA with RWx but ambulates slowly. Pt requires MIN A for sit to supine to assist with BLE for positioning. Will continue to follow and progress as tolerated.     Plan/Recommendations:   Pt would benefit from Inpatient Rehabilitation placement at discharge from facility and requires no DME at discharge.   Pt desires Inpatient Rehabilitation placement at discharge. Pt cooperative; agreeable to therapeutic recommendations and plan of care.     Basic Mobility 6-click:  Rollin = Total, A lot = 2, A little = 3; 4 = None  Supine>Sit:   1 = Total, A lot = 2, A little = 3; 4 = None   Sit>Stand with arms:  1 = Total, A lot = 2, A little = 3; 4 = None  Bed>Chair:   1 = Total, A lot = 2, A little = 3; 4 = None  Ambulate in room:  1 = Total, A lot = 2, A little = 3; 4 = None  3-5 Steps with railin = Total, A lot = 2, A little = 3; 4 =  None  Score: 17    Modified [place]: N/A = No pre-op stroke/TIA    Post-Tx Position: Supine with HOB Elevated, Alarms activated and Call light and personal items within reach  PPE: gloves, surgical mask, eyewear protection

## 2021-12-21 VITALS
DIASTOLIC BLOOD PRESSURE: 69 MMHG | TEMPERATURE: 99 F | SYSTOLIC BLOOD PRESSURE: 154 MMHG | WEIGHT: 193.56 LBS | BODY MASS INDEX: 32.25 KG/M2 | HEIGHT: 65 IN | OXYGEN SATURATION: 93 % | HEART RATE: 105 BPM | RESPIRATION RATE: 21 BRPM

## 2021-12-21 LAB
ANION GAP SERPL CALCULATED.3IONS-SCNC: 15 MMOL/L (ref 5–15)
BUN SERPL-MCNC: 11 MG/DL (ref 6–20)
BUN/CREAT SERPL: 26.8 (ref 7–25)
CALCIUM SPEC-SCNC: 9.9 MG/DL (ref 8.6–10.5)
CHLORIDE SERPL-SCNC: 100 MMOL/L (ref 98–107)
CO2 SERPL-SCNC: 24 MMOL/L (ref 22–29)
CREAT SERPL-MCNC: 0.41 MG/DL (ref 0.57–1)
DEPRECATED RDW RBC AUTO: 38.9 FL (ref 37–54)
ERYTHROCYTE [DISTWIDTH] IN BLOOD BY AUTOMATED COUNT: 13.2 % (ref 12.3–15.4)
GFR SERPL CREATININE-BSD FRML MDRD: >150 ML/MIN/1.73
GLUCOSE BLDC GLUCOMTR-MCNC: 125 MG/DL (ref 70–105)
GLUCOSE BLDC GLUCOMTR-MCNC: 134 MG/DL (ref 70–105)
GLUCOSE BLDC GLUCOMTR-MCNC: 139 MG/DL (ref 70–105)
GLUCOSE SERPL-MCNC: 130 MG/DL (ref 65–99)
HCT VFR BLD AUTO: 31.2 % (ref 34–46.6)
HGB BLD-MCNC: 10.5 G/DL (ref 12–15.9)
MCH RBC QN AUTO: 28.5 PG (ref 26.6–33)
MCHC RBC AUTO-ENTMCNC: 33.6 G/DL (ref 31.5–35.7)
MCV RBC AUTO: 84.8 FL (ref 79–97)
PLATELET # BLD AUTO: 574 10*3/MM3 (ref 140–450)
PMV BLD AUTO: 6.4 FL (ref 6–12)
POTASSIUM SERPL-SCNC: 4.5 MMOL/L (ref 3.5–5.2)
RBC # BLD AUTO: 3.67 10*6/MM3 (ref 3.77–5.28)
SODIUM SERPL-SCNC: 139 MMOL/L (ref 136–145)
WBC NRBC COR # BLD: 14.2 10*3/MM3 (ref 3.4–10.8)

## 2021-12-21 PROCEDURE — 85027 COMPLETE CBC AUTOMATED: CPT | Performed by: NURSE PRACTITIONER

## 2021-12-21 PROCEDURE — 25010000002 ENOXAPARIN PER 10 MG: Performed by: NURSE PRACTITIONER

## 2021-12-21 PROCEDURE — 25010000002 HYDRALAZINE PER 20 MG: Performed by: THORACIC SURGERY (CARDIOTHORACIC VASCULAR SURGERY)

## 2021-12-21 PROCEDURE — 82962 GLUCOSE BLOOD TEST: CPT

## 2021-12-21 PROCEDURE — 80048 BASIC METABOLIC PNL TOTAL CA: CPT | Performed by: THORACIC SURGERY (CARDIOTHORACIC VASCULAR SURGERY)

## 2021-12-21 PROCEDURE — 99024 POSTOP FOLLOW-UP VISIT: CPT | Performed by: THORACIC SURGERY (CARDIOTHORACIC VASCULAR SURGERY)

## 2021-12-21 RX ORDER — HYDROCODONE BITARTRATE AND ACETAMINOPHEN 5; 325 MG/1; MG/1
1 TABLET ORAL EVERY 4 HOURS PRN
Qty: 40 TABLET | Refills: 0 | Status: SHIPPED | OUTPATIENT
Start: 2021-12-21 | End: 2022-01-13 | Stop reason: SDUPTHER

## 2021-12-21 RX ORDER — METOPROLOL TARTRATE 50 MG/1
50 TABLET, FILM COATED ORAL EVERY 12 HOURS SCHEDULED
Status: DISCONTINUED | OUTPATIENT
Start: 2021-12-21 | End: 2021-12-21 | Stop reason: HOSPADM

## 2021-12-21 RX ORDER — CLOPIDOGREL BISULFATE 75 MG/1
75 TABLET ORAL DAILY
Qty: 30 TABLET | Refills: 2 | Status: SHIPPED | OUTPATIENT
Start: 2021-12-22 | End: 2022-01-13 | Stop reason: SDUPTHER

## 2021-12-21 RX ORDER — METOPROLOL TARTRATE 50 MG/1
50 TABLET, FILM COATED ORAL 2 TIMES DAILY
Qty: 60 TABLET | Refills: 2 | Status: SHIPPED | OUTPATIENT
Start: 2021-12-21 | End: 2022-01-13 | Stop reason: SDUPTHER

## 2021-12-21 RX ORDER — FERROUS SULFATE TAB EC 324 MG (65 MG FE EQUIVALENT) 324 (65 FE) MG
324 TABLET DELAYED RESPONSE ORAL
Qty: 30 TABLET | Refills: 2 | Status: SHIPPED | OUTPATIENT
Start: 2021-12-22 | End: 2022-01-13 | Stop reason: SDUPTHER

## 2021-12-21 RX ORDER — FUROSEMIDE 20 MG/1
20 TABLET ORAL DAILY
Qty: 30 TABLET | Refills: 0 | Status: SHIPPED | OUTPATIENT
Start: 2021-12-22 | End: 2022-01-13

## 2021-12-21 RX ADMIN — ACETAMINOPHEN 650 MG: 325 TABLET, FILM COATED ORAL at 06:50

## 2021-12-21 RX ADMIN — METOPROLOL TARTRATE 25 MG: 25 TABLET, FILM COATED ORAL at 08:22

## 2021-12-21 RX ADMIN — CLOPIDOGREL BISULFATE 75 MG: 75 TABLET ORAL at 08:22

## 2021-12-21 RX ADMIN — METOPROLOL TARTRATE 50 MG: 50 TABLET, FILM COATED ORAL at 17:55

## 2021-12-21 RX ADMIN — METOPROLOL TARTRATE 25 MG: 25 TABLET, FILM COATED ORAL at 10:00

## 2021-12-21 RX ADMIN — ASPIRIN 81 MG: 81 TABLET, COATED ORAL at 08:22

## 2021-12-21 RX ADMIN — HYDRALAZINE HYDROCHLORIDE 20 MG: 20 INJECTION INTRAMUSCULAR; INTRAVENOUS at 04:54

## 2021-12-21 RX ADMIN — FERROUS SULFATE TAB EC 324 MG (65 MG FE EQUIVALENT) 324 MG: 324 (65 FE) TABLET DELAYED RESPONSE at 08:22

## 2021-12-21 RX ADMIN — HYDRALAZINE HYDROCHLORIDE 20 MG: 20 INJECTION INTRAMUSCULAR; INTRAVENOUS at 00:03

## 2021-12-21 RX ADMIN — METFORMIN HYDROCHLORIDE 500 MG: 500 TABLET ORAL at 08:22

## 2021-12-21 RX ADMIN — ACETAMINOPHEN 650 MG: 325 TABLET, FILM COATED ORAL at 14:46

## 2021-12-21 RX ADMIN — Medication 10 ML: at 08:21

## 2021-12-21 RX ADMIN — FUROSEMIDE 20 MG: 20 TABLET ORAL at 08:22

## 2021-12-21 RX ADMIN — ENOXAPARIN SODIUM 40 MG: 40 INJECTION SUBCUTANEOUS at 17:54

## 2021-12-21 RX ADMIN — HYDROCODONE BITARTRATE AND ACETAMINOPHEN 1 TABLET: 5; 325 TABLET ORAL at 00:47

## 2021-12-21 NOTE — PLAN OF CARE
Goal Outcome Evaluation:      Patient being discharged to [facility]  For rehab.             none

## 2021-12-21 NOTE — PAYOR COMM NOTE
"This is clinical update for Saundra Schilling, auth# [id number]    EXTENDED AUTHORIZATION PENDING: We recd overturned denial approved for 1 day 12/13 to 12/14. Please send confirmation of all days approved.    Please call or fax determination to contact below.   Thank you.    Marcia Smith, RN, BSN  Utilization Review Nurse  [facility]  Direct & confidential phone # [phone]  Fax # [phone]      Saundra Schilling (51 y.o. Female)             Date of Birth Social Security Number Address Home Phone MRN    1970  [id number] [place] [address] [phone] [id number]    Faith Marital Status             None        Admission Date Admission Type Admitting Provider Attending Provider Department, Room/Bed    12/13/21 Urgent Samuel Berger MD Pagni, Sebastian, MD [facility] CARDIOVASCULAR CARE UNIT, 2205/1    Discharge Date Discharge Disposition Discharge Destination                         Attending Provider: Samuel Berger MD    Allergies: Nitrofuran Derivatives    Isolation: None   Infection: None   Code Status: CPR   Advance Care Planning Activity    Ht: 165.1 cm (65\")   Wt: 87.8 kg (193 lb 9 oz)    Admission Cmt: None   Principal Problem: None                Active Insurance as of 12/13/2021     Primary Coverage     Payor Plan Insurance Group Employer/Plan Group    [employer] BLUE SHIELD PPO [id number]     Payor Plan Address Payor Plan Phone Number Payor Plan Fax Number Effective Dates    [address] [phone]  1/1/2021 - None Entered    [facility] [id number]       Subscriber Name Subscriber Birth Date Member ID       SAUNDRA SCHILLING 1970 [id number]                 Emergency Contacts      (Rel.) Home Phone Work Phone Mobile Phone    ELISA SCHILLING (Spouse) [phone] -- [phone]    Bairon Schilling (Son) [phone] -- --    PhuGm (Brother) -- -- [phone]              Operative/Procedure " "Notes (last 48 hours)  Notes from 12/19/21 1103 through 12/21/21 1103   No notes of this type exist for this encounter.          Physician Progress Notes (last 48 hours)      Samuel Berger MD at 12/21/21 0842           LOS: 8 days   Patient Care Team:  Treasure Dickinson APRN as PCP - General (Nurse Practitioner)    Chief Complaint: post op fu    Subjective:  Symptoms:  No shortness of breath or chest pain.    Diet:  No nausea.    Activity level: Returning to normal.    Pain:  She reports no pain.      C/o insomnia r/t pain     Vital Signs  Temp:  [97.3 °F (36.3 °C)-98.6 °F (37 °C)] 98.6 °F (37 °C)  Heart Rate:  [] 109  Resp:  [17-22] 18  BP: (125-168)/(55-82) 125/67  Body mass index is 32.21 kg/m².    Intake/Output Summary (Last 24 hours) at 12/21/2021 0842  Last data filed at 12/21/2021 0600  Gross per 24 hour   Intake 240 ml   Output 1675 ml   Net -1435 ml     No intake/output data recorded.          12/18/21  0639 12/19/21  0610 12/21/21  0500   Weight: 90.9 kg (200 lb 6.4 oz) 89.9 kg (198 lb 3.1 oz) 87.8 kg (193 lb 9 oz)         Objective:  General Appearance:  Comfortable.    Vital signs: (most recent): Blood pressure 125/67, pulse 109, temperature 98.6 °F (37 °C), temperature source Oral, resp. rate 18, height 165.1 cm (65\"), weight 87.8 kg (193 lb 9 oz), SpO2 95 %, not currently breastfeeding.    Output: Producing urine and producing stool.    Lungs:  Normal effort and normal respiratory rate.  (Room air)  Heart: Tachycardia.  Regular rhythm.  (NSR, rate 109)  Abdomen: Abdomen is soft and non-distended.    Extremities: There is no dependent edema.    Neurological: Patient is alert and oriented to person, place and time.    Skin:  Warm and dry.  (Sternal and leg incisions well approximated without erythema, edema, or drainage)            Results Review:        WBC WBC   Date Value Ref Range Status   12/20/2021 10.00 3.40 - 10.80 10*3/mm3 Final   12/19/2021 8.20 3.40 - 10.80 10*3/mm3 Final      HGB " Hemoglobin   Date Value Ref Range Status   12/20/2021 9.9 (L) 12.0 - 15.9 g/dL Final   12/19/2021 9.8 (L) 12.0 - 15.9 g/dL Final      HCT Hematocrit   Date Value Ref Range Status   12/20/2021 29.3 (L) 34.0 - 46.6 % Final   12/19/2021 29.0 (L) 34.0 - 46.6 % Final      Platelets Platelets   Date Value Ref Range Status   12/20/2021 503 (H) 140 - 450 10*3/mm3 Final   12/19/2021 422 140 - 450 10*3/mm3 Final        PT/INR:  No results found for: PROTIME/No results found for: INR    Sodium Sodium   Date Value Ref Range Status   12/20/2021 139 136 - 145 mmol/L Final   12/19/2021 140 136 - 145 mmol/L Final      Potassium Potassium   Date Value Ref Range Status   12/20/2021 4.3 3.5 - 5.2 mmol/L Final   12/20/2021 3.5 3.5 - 5.2 mmol/L Final   12/19/2021 4.0 3.5 - 5.2 mmol/L Final      Chloride Chloride   Date Value Ref Range Status   12/20/2021 101 98 - 107 mmol/L Final   12/19/2021 102 98 - 107 mmol/L Final      Bicarbonate CO2   Date Value Ref Range Status   12/20/2021 25.0 22.0 - 29.0 mmol/L Final   12/19/2021 24.0 22.0 - 29.0 mmol/L Final      BUN BUN   Date Value Ref Range Status   12/20/2021 13 6 - 20 mg/dL Final   12/19/2021 18 6 - 20 mg/dL Final      Creatinine Creatinine   Date Value Ref Range Status   12/20/2021 0.43 (L) 0.57 - 1.00 mg/dL Final   12/19/2021 0.47 (L) 0.57 - 1.00 mg/dL Final      Calcium Calcium   Date Value Ref Range Status   12/20/2021 9.5 8.6 - 10.5 mg/dL Final   12/19/2021 9.6 8.6 - 10.5 mg/dL Final      Magnesium No results found for: MG   Troponin No results found for: TROPONIN   BNP No results found for: BNP         aspirin, 81 mg, Oral, Daily  atorvastatin, 40 mg, Oral, Nightly  chlorhexidine, 15 mL, Mouth/Throat, Q12H  clopidogrel, 75 mg, Oral, Daily  enoxaparin, 40 mg, Subcutaneous, Daily  ferrous sulfate, 324 mg, Oral, Daily With Breakfast  furosemide, 20 mg, Oral, Daily  insulin lispro, 0-9 Units, Subcutaneous, TID AC  metFORMIN, 500 mg, Oral, Daily With Breakfast  metoprolol tartrate, 25  mg, Oral, TID  mupirocin, , Each Nare, BID  polyethylene glycol, 17 g, Oral, BID  senna-docusate sodium, 2 tablet, Oral, BID  sodium chloride, 10 mL, Intravenous, Q12H           PRN Meds:.•  acetaminophen **OR** acetaminophen **OR** acetaminophen  •  bisacodyl  •  bisacodyl  •  cyclobenzaprine  •  dextrose  •  dextrose  •  glucagon (human recombinant)  •  hydrALAZINE  •  HYDROcodone-acetaminophen  •  insulin lispro **AND** insulin lispro  •  magnesium hydroxide  •  ondansetron  •  sodium chloride  •  sodium chloride    Patient Active Problem List   Diagnosis Code   • CAD, multiple vessel I25.10       Assessment & Plan    -MV CAD, EF 55-60%--s/p urgent CABG x4 with LIMA----POD #7 ([facility])  -NSTEMI--start Plavix 11/18  -HTN--stable  -HLD--statin  -DM type 2--a1c 7.9, no home meds for DM listed  -Asthma--stable  -Obesity, stage stage 1--BMI 33.4  -Social situation-- just started chemo for throat cancer and she has a 15 y/o son  -Postop ABLA, expected--add iron, no transfusion required    No further arrhythmia, change lopressor to 50 bid with tachycardia.  Increase metformin to BID with continued hyperglycemia.  Plan for rehab when bed available.      BJ Oakley  12/21/21  08:42 EST    Electronically signed by Samuel Berger MD at 12/21/21 1024     Samuel Berger MD at 12/20/21 1029           LOS: 7 days   Patient Care Team:  Treasure Dickinson APRN as PCP - General (Nurse Practitioner)    Chief Complaint: post op fu    Subjective:  Symptoms:  No shortness of breath or chest pain.    Diet:  No nausea.    Activity level: Returning to normal.    Pain:  She reports no pain.          Vital Signs  Temp:  [98 °F (36.7 °C)-100 °F (37.8 °C)] 98 °F (36.7 °C)  Heart Rate:  [] 89  Resp:  [15-25] 21  BP: (131-176)/(55-84) 151/55  Body mass index is 32.98 kg/m².    Intake/Output Summary (Last 24 hours) at 12/20/2021 1029  Last data filed at 12/20/2021 1000  Gross per 24 hour   Intake 480 ml   Output 1200  "ml   Net -720 ml     I/O this shift:  In: -   Out: 400 [Urine:400]          12/17/21  1359 12/18/21  0639 12/19/21  0610   Weight: 91.2 kg (201 lb) 90.9 kg (200 lb 6.4 oz) 89.9 kg (198 lb 3.1 oz)         Objective:  General Appearance:  Comfortable.    Vital signs: (most recent): Blood pressure 151/55, pulse 89, temperature 98 °F (36.7 °C), temperature source Oral, resp. rate 21, height 165.1 cm (65\"), weight 89.9 kg (198 lb 3.1 oz), SpO2 95 %, not currently breastfeeding.    Output: Producing urine and producing stool.    Lungs:  Normal effort and normal respiratory rate.  Breath sounds clear to auscultation.    Heart: Normal rate.  Regular rhythm.  S1 normal and S2 normal.    Extremities: There is no dependent edema.    Neurological: Patient is alert and oriented to person, place and time.    Skin:  Warm and dry.  (Sternal and leg incisions well approximated without erythema, edema, or drainage)            Results Review:        WBC WBC   Date Value Ref Range Status   12/20/2021 10.00 3.40 - 10.80 10*3/mm3 Final   12/19/2021 8.20 3.40 - 10.80 10*3/mm3 Final   12/18/2021 9.80 3.40 - 10.80 10*3/mm3 Final      HGB Hemoglobin   Date Value Ref Range Status   12/20/2021 9.9 (L) 12.0 - 15.9 g/dL Final   12/19/2021 9.8 (L) 12.0 - 15.9 g/dL Final   12/18/2021 8.4 (L) 12.0 - 15.9 g/dL Final      HCT Hematocrit   Date Value Ref Range Status   12/20/2021 29.3 (L) 34.0 - 46.6 % Final   12/19/2021 29.0 (L) 34.0 - 46.6 % Final   12/18/2021 25.2 (L) 34.0 - 46.6 % Final      Platelets Platelets   Date Value Ref Range Status   12/20/2021 503 (H) 140 - 450 10*3/mm3 Final   12/19/2021 422 140 - 450 10*3/mm3 Final   12/18/2021 290 140 - 450 10*3/mm3 Final        PT/INR:  No results found for: PROTIME/No results found for: INR    Sodium Sodium   Date Value Ref Range Status   12/20/2021 139 136 - 145 mmol/L Final   12/19/2021 140 136 - 145 mmol/L Final   12/18/2021 139 136 - 145 mmol/L Final      Potassium Potassium   Date Value Ref " Range Status   12/20/2021 3.5 3.5 - 5.2 mmol/L Final   12/19/2021 4.0 3.5 - 5.2 mmol/L Final   12/18/2021 4.1 3.5 - 5.2 mmol/L Final      Chloride Chloride   Date Value Ref Range Status   12/20/2021 101 98 - 107 mmol/L Final   12/19/2021 102 98 - 107 mmol/L Final   12/18/2021 104 98 - 107 mmol/L Final      Bicarbonate CO2   Date Value Ref Range Status   12/20/2021 25.0 22.0 - 29.0 mmol/L Final   12/19/2021 24.0 22.0 - 29.0 mmol/L Final   12/18/2021 23.0 22.0 - 29.0 mmol/L Final      BUN BUN   Date Value Ref Range Status   12/20/2021 13 6 - 20 mg/dL Final   12/19/2021 18 6 - 20 mg/dL Final   12/18/2021 24 (H) 6 - 20 mg/dL Final      Creatinine Creatinine   Date Value Ref Range Status   12/20/2021 0.43 (L) 0.57 - 1.00 mg/dL Final   12/19/2021 0.47 (L) 0.57 - 1.00 mg/dL Final   12/18/2021 0.54 (L) 0.57 - 1.00 mg/dL Final      Calcium Calcium   Date Value Ref Range Status   12/20/2021 9.5 8.6 - 10.5 mg/dL Final   12/19/2021 9.6 8.6 - 10.5 mg/dL Final   12/18/2021 9.0 8.6 - 10.5 mg/dL Final      Magnesium No results found for: MG   Troponin No results found for: TROPONIN   BNP No results found for: BNP         aspirin, 81 mg, Oral, Daily  atorvastatin, 40 mg, Oral, Nightly  chlorhexidine, 15 mL, Mouth/Throat, Q12H  clopidogrel, 75 mg, Oral, Daily  enoxaparin, 40 mg, Subcutaneous, Daily  ferrous sulfate, 324 mg, Oral, Daily With Breakfast  furosemide, 20 mg, Oral, Daily  insulin lispro, 0-9 Units, Subcutaneous, TID AC  metFORMIN, 500 mg, Oral, Daily With Breakfast  metoprolol tartrate, 12.5 mg, Oral, Q12H  mupirocin, , Each Nare, BID  polyethylene glycol, 17 g, Oral, BID  senna-docusate sodium, 2 tablet, Oral, BID  sodium chloride, 10 mL, Intravenous, Q12H           PRN Meds:.•  acetaminophen **OR** acetaminophen **OR** acetaminophen  •  bisacodyl  •  bisacodyl  •  cyclobenzaprine  •  dextrose  •  dextrose  •  glucagon (human recombinant)  •  hydrALAZINE  •  HYDROcodone-acetaminophen  •  insulin lispro **AND** insulin  lispro  •  magnesium hydroxide  •  ondansetron  •  sodium chloride  •  sodium chloride    Patient Active Problem List   Diagnosis Code   • CAD, multiple vessel I25.10       Assessment & Plan    -MV CAD, EF 55-60%--s/p urgent CABG x4 with LIMA----POD #6 ([facility])  -NSTEMI--start Plavix 11/18  -HTN--stable  -HLD--statin  -DM type 2--a1c 7.9, no home meds for DM listed  -Asthma--stable  -Obesity, stage stage 1--BMI 33.4  -Social situation-- just started chemo for throat cancer and she has a 13 y/o son  -Postop ABLA, expected--add iron, no transfusion required    Short run of AF aberrency, 11 beats, this am, increase beta blocker and give 1 gm Magnesium.  Plan for rehab 1-2 days.     Dinorah Orellana, BJ  12/20/21  10:29 EST    Electronically signed by Samuel Berger MD at 12/21/21 1024       Consult Notes (last 48 hours)  Notes from 12/19/21 1103 through 12/21/21 1103   No notes of this type exist for this encounter.

## 2021-12-21 NOTE — PROGRESS NOTES
" LOS: 8 days   Patient Care Team:  Treasure Dickinson APRN as PCP - General (Nurse Practitioner)    Chief Complaint: post op fu    Subjective:  Symptoms:  No shortness of breath or chest pain.    Diet:  No nausea.    Activity level: Returning to normal.    Pain:  She reports no pain.      C/o insomnia r/t pain     Vital Signs  Temp:  [97.3 °F (36.3 °C)-98.6 °F (37 °C)] 98.6 °F (37 °C)  Heart Rate:  [] 109  Resp:  [17-22] 18  BP: (125-168)/(55-82) 125/67  Body mass index is 32.21 kg/m².    Intake/Output Summary (Last 24 hours) at 12/21/2021 0842  Last data filed at 12/21/2021 0600  Gross per 24 hour   Intake 240 ml   Output 1675 ml   Net -1435 ml     No intake/output data recorded.          12/18/21  0639 12/19/21  0610 12/21/21  0500   Weight: 90.9 kg (200 lb 6.4 oz) 89.9 kg (198 lb 3.1 oz) 87.8 kg (193 lb 9 oz)         Objective:  General Appearance:  Comfortable.    Vital signs: (most recent): Blood pressure 125/67, pulse 109, temperature 98.6 °F (37 °C), temperature source Oral, resp. rate 18, height 165.1 cm (65\"), weight 87.8 kg (193 lb 9 oz), SpO2 95 %, not currently breastfeeding.    Output: Producing urine and producing stool.    Lungs:  Normal effort and normal respiratory rate.  (Room air)  Heart: Tachycardia.  Regular rhythm.  (NSR, rate 109)  Abdomen: Abdomen is soft and non-distended.    Extremities: There is no dependent edema.    Neurological: Patient is alert and oriented to person, place and time.    Skin:  Warm and dry.  (Sternal and leg incisions well approximated without erythema, edema, or drainage)            Results Review:        WBC WBC   Date Value Ref Range Status   12/20/2021 10.00 3.40 - 10.80 10*3/mm3 Final   12/19/2021 8.20 3.40 - 10.80 10*3/mm3 Final      HGB Hemoglobin   Date Value Ref Range Status   12/20/2021 9.9 (L) 12.0 - 15.9 g/dL Final   12/19/2021 9.8 (L) 12.0 - 15.9 g/dL Final      HCT Hematocrit   Date Value Ref Range Status   12/20/2021 29.3 (L) 34.0 - 46.6 % Final "   12/19/2021 29.0 (L) 34.0 - 46.6 % Final      Platelets Platelets   Date Value Ref Range Status   12/20/2021 503 (H) 140 - 450 10*3/mm3 Final   12/19/2021 422 140 - 450 10*3/mm3 Final        PT/INR:  No results found for: PROTIME/No results found for: INR    Sodium Sodium   Date Value Ref Range Status   12/20/2021 139 136 - 145 mmol/L Final   12/19/2021 140 136 - 145 mmol/L Final      Potassium Potassium   Date Value Ref Range Status   12/20/2021 4.3 3.5 - 5.2 mmol/L Final   12/20/2021 3.5 3.5 - 5.2 mmol/L Final   12/19/2021 4.0 3.5 - 5.2 mmol/L Final      Chloride Chloride   Date Value Ref Range Status   12/20/2021 101 98 - 107 mmol/L Final   12/19/2021 102 98 - 107 mmol/L Final      Bicarbonate CO2   Date Value Ref Range Status   12/20/2021 25.0 22.0 - 29.0 mmol/L Final   12/19/2021 24.0 22.0 - 29.0 mmol/L Final      BUN BUN   Date Value Ref Range Status   12/20/2021 13 6 - 20 mg/dL Final   12/19/2021 18 6 - 20 mg/dL Final      Creatinine Creatinine   Date Value Ref Range Status   12/20/2021 0.43 (L) 0.57 - 1.00 mg/dL Final   12/19/2021 0.47 (L) 0.57 - 1.00 mg/dL Final      Calcium Calcium   Date Value Ref Range Status   12/20/2021 9.5 8.6 - 10.5 mg/dL Final   12/19/2021 9.6 8.6 - 10.5 mg/dL Final      Magnesium No results found for: MG   Troponin No results found for: TROPONIN   BNP No results found for: BNP         aspirin, 81 mg, Oral, Daily  atorvastatin, 40 mg, Oral, Nightly  chlorhexidine, 15 mL, Mouth/Throat, Q12H  clopidogrel, 75 mg, Oral, Daily  enoxaparin, 40 mg, Subcutaneous, Daily  ferrous sulfate, 324 mg, Oral, Daily With Breakfast  furosemide, 20 mg, Oral, Daily  insulin lispro, 0-9 Units, Subcutaneous, TID AC  metFORMIN, 500 mg, Oral, Daily With Breakfast  metoprolol tartrate, 25 mg, Oral, TID  mupirocin, , Each Nare, BID  polyethylene glycol, 17 g, Oral, BID  senna-docusate sodium, 2 tablet, Oral, BID  sodium chloride, 10 mL, Intravenous, Q12H           PRN Meds:.•  acetaminophen **OR**  acetaminophen **OR** acetaminophen  •  bisacodyl  •  bisacodyl  •  cyclobenzaprine  •  dextrose  •  dextrose  •  glucagon (human recombinant)  •  hydrALAZINE  •  HYDROcodone-acetaminophen  •  insulin lispro **AND** insulin lispro  •  magnesium hydroxide  •  ondansetron  •  sodium chloride  •  sodium chloride    Patient Active Problem List   Diagnosis Code   • CAD, multiple vessel I25.10       Assessment & Plan    -MV CAD, EF 55-60%--s/p urgent CABG x4 with LIMA----POD #7 ([facility])  -NSTEMI--start Plavix 11/18  -HTN--stable  -HLD--statin  -DM type 2--a1c 7.9, no home meds for DM listed  -Asthma--stable  -Obesity, stage stage 1--BMI 33.4  -Social situation-- just started chemo for throat cancer and she has a 15 y/o son  -Postop ABLA, expected--add iron, no transfusion required    No further arrhythmia, change lopressor to 50 bid with tachycardia.  Increase metformin to BID with continued hyperglycemia.  Plan for rehab when bed available.      Dinorah Orellana, APRN  12/21/21  08:42 EST

## 2021-12-21 NOTE — DISCHARGE SUMMARY
Date of Admission: 12/14/2021  Date of Discharge:  12/21/2021    Discharge Diagnosis:   -MV CAD, EF 55-60%--s/p urgent CABG x4 with LIMA  -NSTEMI--start Plavix 11/18  -HTN--stable  -HLD--statin  -DM type 2--a1c 7.9, no home meds for DM listed  -Asthma--stable  -Obesity, stage stage 1--BMI 33.4  -Social situation-- just started chemo for throat cancer and she has a 15 y/o son  -Postop ABLA, expected--add iron, no transfusion required     Presenting Problem/History of Present Illness  CAD, multiple vessel [I25.10]     Hospital Course  Patient is a 51 y.o. female transferred from [facility] after presentation with chest pain/non-STEMI and cardiac cath demonstrating three-vessel CAD.  She underwent urgent CABG x4 with LIMA to LAD, SVG to PDA, OM1, OM 2, EVH of right leg on 12/14/2021 with Dr. Berger (see op report for full details).  Her postop course has been relatively uneventful, she is still somewhat weak and has qualified for inpatient rehab prior to returning home.  She did have 1 brief episode of A. fib aberrancy for approximately 16 beats that has been successfully suppressed with metoprolol.  Plavix has been initiated with her non-STEMI in addition to her aspirin, and I have increased her Metformin as she has had continued hyperglycemia during her hospitalization.  She has had no issues with her asthma, and her blood pressure has been relatively well controlled.  She is to follow-up in our office in 2 weeks for incision check, and she will need to contact her primary cardiologist for an appointment in 1 month.     Discharge care included:     post procedure instructions given     discharge care discussed with the nurse and the patient     post procedure appointments made    Procedures Performed  Procedure(s):  CORONARY ARTERY BYPASS GRAFTING       Consults:   Consults     Date and Time Order Name Status Description    12/14/2021  9:32 AM Inpatient Anesthesiology Consult            Pertinent  Test Results:    Lab Results   Component Value Date    WBC 14.20 (H) 12/21/2021    HGB 10.5 (L) 12/21/2021    HCT 31.2 (L) 12/21/2021    MCV 84.8 12/21/2021     (H) 12/21/2021      Lab Results   Component Value Date    GLUCOSE 130 (H) 12/21/2021    CALCIUM 9.9 12/21/2021     12/21/2021    K 4.5 12/21/2021    CO2 24.0 12/21/2021     12/21/2021    BUN 11 12/21/2021    CREATININE 0.41 (L) 12/21/2021    EGFRIFNONA >150 12/21/2021    BCR 26.8 (H) 12/21/2021    ANIONGAP 15.0 12/21/2021     Lab Results   Component Value Date    INR 1.02 12/15/2021    PROTIME 11.3 12/15/2021         Condition on Discharge: Stable    Vital Signs  Temp:  [97.6 °F (36.4 °C)-98.6 °F (37 °C)] 97.7 °F (36.5 °C)  Heart Rate:  [] 91  Resp:  [17-24] 24  BP: (117-169)/(57-82) 135/67    General:      well developed, well nourished, in no acute distress.    Head:      normocephalic and atraumatic.    Eyes:      PERRL/EOM intact, conjunctiva and sclera clear with out nystagmus.    Neck:      no masses, thyromegaly,  trachea central with normal respiratory effort and PMI displaced laterally  Lungs:      clear bilaterally to auscultation.    Heart:      Normal S1-S2, no murmur, gallop, rub  Msk:      no deformity or scoliosis noted of thoracic or lumbar spine.    Pulses:      pulses normal in all 4 extremities.    Extremities:       no cyanosis or clubbing, no pretibial edema  Neurologic:      no focal deficits.   alert oriented x3  Skin:      intact without lesions or rashes.  Midsternal and right lower extremity surgical site incisions well approximated without erythema, edema, or drainage     Psych:      alert and cooperative; normal mood and affect; normal attention span and concentration.          Discharge Disposition  Rehab Facility or Unit (DC - External)    Discharge Medications     Discharge Medications      New Medications      Instructions Start Date   clopidogrel 75 MG tablet  Commonly known as: PLAVIX   75 mg, Oral,  Daily   Start Date: December 22, 2021     ferrous sulfate 324 (65 Fe) MG tablet delayed-release EC tablet   324 mg, Oral, Daily With Breakfast   Start Date: December 22, 2021     furosemide 20 MG tablet  Commonly known as: LASIX   20 mg, Oral, Daily   Start Date: December 22, 2021     HYDROcodone-acetaminophen 5-325 MG per tablet  Commonly known as: NORCO   1 tablet, Oral, Every 4 Hours PRN      metoprolol tartrate 50 MG tablet  Commonly known as: LOPRESSOR   50 mg, Oral, 2 Times Daily         Changes to Medications      Instructions Start Date   metFORMIN 500 MG tablet  Commonly known as: GLUCOPHAGE  What changed: when to take this   500 mg, Oral, 2 Times Daily With Meals         Continue These Medications      Instructions Start Date   aspirin 81 MG EC tablet   81 mg, Oral, Every Morning      atorvastatin 80 MG tablet  Commonly known as: LIPITOR   80 mg, Oral, Nightly         Stop These Medications    carvedilol 25 MG tablet  Commonly known as: COREG     lisinopril 40 MG tablet  Commonly known as: PRINIVIL,ZESTRIL     lisinopril-hydrochlorothiazide 20-12.5 MG per tablet  Commonly known as: PRINZIDE,ZESTORETIC            Discharge Diet:     Activity at Discharge:     Follow-up Appointments  Future Appointments   Date Time Provider Department Center   1/6/2022 10:00 AM Dinorah Orellana APRN [facility]     Additional Instructions for the Follow-ups that You Need to Schedule     Call MD With Problems / Concerns   As directed      Instructions:  Call office at [phone] for any drainage, increased redness, or fever over 100.5    Order Comments: Instructions:  Call office at [phone] for any drainage, increased redness, or fever over 100.5          Discharge Follow-up with PCP   As directed       Currently Documented PCP:    Treasure Dickinson APRN    PCP Phone Number:    [phone]     Follow Up Details: in 1 week         Discharge Follow-up with Specified Provider: Cardiologist (CHETAN Lee); 1 Month   As  directed      To: Cardiologist (CHETAN Lee)    Follow Up: 1 Month    Follow Up Details: call for appointment, bring all medication bottles to appointment         Discharge Follow-up with Specified Provider: Dr. Berger'leslie LORENZO, Jan 6th at 10:00 am, [facility]   As directed      To: Dr. Bains APRN, Jan 6th at 10:00 am, [facility]               Test Results Pending at Discharge         BJ Oakley  12/21/21  15:15 EST

## 2021-12-21 NOTE — CASE MANAGEMENT/SOCIAL WORK
Continued Stay Note  VIC Romero     Patient Name: Saundra Holder  MRN: [id number]  Today's Date: 12/21/2021    Admit Date: 12/13/2021     Discharge Plan     Row Name 12/21/21 1441       Plan    Plan Accepted at [facility].  PASRR per facility    Plan Comments Patient selected [facility].  Accepted. PASRR per facilty.                            Expected Discharge Date and Time     Expected Discharge Date Expected Discharge Time    Dec 21, 2021             Marci Sun RN   Met with patient in room wearing PPE: mask, face shield/goggles, gloves, gown.      Maintained distance greater than six feet and spent less than 15 minutes in the room.

## 2021-12-23 LAB — QT INTERVAL: 357 MS

## 2021-12-23 NOTE — PAYOR COMM NOTE
"This is discharge notice for Cheo Schilling  Reference/Auth # [id number]  Pt discharged to skilled nsg facility on 12/21/21.    EXTENDED AUTHORIZATION PENDING: This case was approved for 2 days--clinical update was sent. Please send determination for the rest of the stay.    Please call or fax determination to contact below.   Thank you.    Marcia Smith RN, BSN  Utilization Review Nurse  [facility]  Direct & confidential phone # [phone]  Fax # [phone]      Marcia Smith RN, BSN  Utilization Review Nurse  [facility]  Direct & confidential phone # [phone]  Fax # [phone]      Cheo Schilling (51 y.o. Female)             Date of Birth Social Security Number Address Home Phone MRN    1970  [address] [facility] [place] [address] [phone] [id number]    Scientologist Marital Status             None        Admission Date Admission Type Admitting Provider Attending Provider Department, Room/Bed    12/13/21 Urgent Samuel Berger MD  [facility] CARDIOVASCULAR CARE UNIT, 2205/1    Discharge Date Discharge Disposition Discharge Destination          12/21/2021 Rehab Facility or Unit (DC - External)              Attending Provider: (none)   Allergies: Nitrofuran Derivatives    Isolation: None   Infection: None   Code Status: Prior   Advance Care Planning Activity    Ht: 165.1 cm (65\")   Wt: 87.8 kg (193 lb 9 oz)    Admission Cmt: None   Principal Problem: None                Active Insurance as of 12/13/2021     Primary Coverage     Payor Plan Insurance Group Employer/Plan Group    [employer]HE[employer] BLUE CROSS [facility] [employer] BLUE [facility] PPO [id number]     Payor Plan Address Payor Plan Phone Number Payor Plan Fax Number Effective Dates    [address] [phone]  1/1/2021 - None Entered    [facility] [id number]       Subscriber Name Subscriber Birth Date Member ID       CHEO SCHILLING 1970 [id number]                 Emergency Contacts     "  (Rel.) Home Phone Work Phone Mobile Phone    ELISA HOLDER (Spouse) [phone] -- [phone]    Bairon Holder (Son) [phone] -- --    Gm Bay (Brother) -- -- [phone]               Discharge Summary      Dinorah Orellana APRN at 12/21/21 2248     Attestation signed by Samuel Berger MD at 12/22/21 6510    I have reviewed this documentation and agree.                    Date of Admission: 12/14/2021  Date of Discharge:  12/21/2021    Discharge Diagnosis:   -MV CAD, EF 55-60%--s/p urgent CABG x4 with LIMA  -NSTEMI--start Plavix 11/18  -HTN--stable  -HLD--statin  -DM type 2--a1c 7.9, no home meds for DM listed  -Asthma--stable  -Obesity, stage stage 1--BMI 33.4  -Social situation-- just started chemo for throat cancer and she has a 15 y/o son  -Postop ABLA, expected--add iron, no transfusion required     Presenting Problem/History of Present Illness  CAD, multiple vessel [I25.10]     Hospital Course  Patient is a 51 y.o. female transferred from [facility] after presentation with chest pain/non-STEMI and cardiac cath demonstrating three-vessel CAD.  She underwent urgent CABG x4 with LIMA to LAD, SVG to PDA, OM1, OM 2, EVH of right leg on 12/14/2021 with Dr. Berger (see op report for full details).  Her postop course has been relatively uneventful, she is still somewhat weak and has qualified for inpatient rehab prior to returning home.  She did have 1 brief episode of A. fib aberrancy for approximately 16 beats that has been successfully suppressed with metoprolol.  Plavix has been initiated with her non-STEMI in addition to her aspirin, and I have increased her Metformin as she has had continued hyperglycemia during her hospitalization.  She has had no issues with her asthma, and her blood pressure has been relatively well controlled.  She is to follow-up in our office in 2 weeks for incision check, and she will need to contact her primary cardiologist for an  appointment in 1 month.     Discharge care included:     post procedure instructions given     discharge care discussed with the nurse and the patient     post procedure appointments made    Procedures Performed  Procedure(s):  CORONARY ARTERY BYPASS GRAFTING       Consults:   Consults     Date and Time Order Name Status Description    12/14/2021  9:32 AM Inpatient Anesthesiology Consult            Pertinent Test Results:    Lab Results   Component Value Date    WBC 14.20 (H) 12/21/2021    HGB 10.5 (L) 12/21/2021    HCT 31.2 (L) 12/21/2021    MCV 84.8 12/21/2021     (H) 12/21/2021      Lab Results   Component Value Date    GLUCOSE 130 (H) 12/21/2021    CALCIUM 9.9 12/21/2021     12/21/2021    K 4.5 12/21/2021    CO2 24.0 12/21/2021     12/21/2021    BUN 11 12/21/2021    CREATININE 0.41 (L) 12/21/2021    EGFRIFNONA >150 12/21/2021    BCR 26.8 (H) 12/21/2021    ANIONGAP 15.0 12/21/2021     Lab Results   Component Value Date    INR 1.02 12/15/2021    PROTIME 11.3 12/15/2021         Condition on Discharge: Stable    Vital Signs  Temp:  [97.6 °F (36.4 °C)-98.6 °F (37 °C)] 97.7 °F (36.5 °C)  Heart Rate:  [] 91  Resp:  [17-24] 24  BP: (117-169)/(57-82) 135/67    General:      well developed, well nourished, in no acute distress.    Head:      normocephalic and atraumatic.    Eyes:      PERRL/EOM intact, conjunctiva and sclera clear with out nystagmus.    Neck:      no masses, thyromegaly,  trachea central with normal respiratory effort and PMI displaced laterally  Lungs:      clear bilaterally to auscultation.    Heart:      Normal S1-S2, no murmur, gallop, rub  Msk:      no deformity or scoliosis noted of thoracic or lumbar spine.    Pulses:      pulses normal in all 4 extremities.    Extremities:       no cyanosis or clubbing, no pretibial edema  Neurologic:      no focal deficits.   alert oriented x3  Skin:      intact without lesions or rashes.  Midsternal and right lower extremity surgical  site incisions well approximated without erythema, edema, or drainage     Psych:      alert and cooperative; normal mood and affect; normal attention span and concentration.          Discharge Disposition  Rehab Facility or Unit (DC - External)    Discharge Medications     Discharge Medications      New Medications      Instructions Start Date   clopidogrel 75 MG tablet  Commonly known as: PLAVIX   75 mg, Oral, Daily   Start Date: December 22, 2021     ferrous sulfate 324 (65 Fe) MG tablet delayed-release EC tablet   324 mg, Oral, Daily With Breakfast   Start Date: December 22, 2021     furosemide 20 MG tablet  Commonly known as: LASIX   20 mg, Oral, Daily   Start Date: December 22, 2021     HYDROcodone-acetaminophen 5-325 MG per tablet  Commonly known as: NORCO   1 tablet, Oral, Every 4 Hours PRN      metoprolol tartrate 50 MG tablet  Commonly known as: LOPRESSOR   50 mg, Oral, 2 Times Daily         Changes to Medications      Instructions Start Date   metFORMIN 500 MG tablet  Commonly known as: GLUCOPHAGE  What changed: when to take this   500 mg, Oral, 2 Times Daily With Meals         Continue These Medications      Instructions Start Date   aspirin 81 MG EC tablet   81 mg, Oral, Every Morning      atorvastatin 80 MG tablet  Commonly known as: LIPITOR   80 mg, Oral, Nightly         Stop These Medications    carvedilol 25 MG tablet  Commonly known as: COREG     lisinopril 40 MG tablet  Commonly known as: PRINIVIL,ZESTRIL     lisinopril-hydrochlorothiazide 20-12.5 MG per tablet  Commonly known as: PRINZIDE,ZESTORETIC            Discharge Diet:     Activity at Discharge:     Follow-up Appointments  Future Appointments   Date Time Provider Department Center   1/6/2022 10:00 AM Dinorah Orellana APRN [facility]     Additional Instructions for the Follow-ups that You Need to Schedule     Call MD With Problems / Concerns   As directed      Instructions:  Call office at [phone] for any drainage, increased  redness, or fever over 100.5    Order Comments: Instructions:  Call office at [phone] for any drainage, increased redness, or fever over 100.5          Discharge Follow-up with PCP   As directed       Currently Documented PCP:    Treasure Dickinson APRN    PCP Phone Number:    [phone]     Follow Up Details: in 1 week         Discharge Follow-up with Specified Provider: Cardiologist (CHETAN Lee); 1 Month   As directed      To: Cardiologist (CHETAN Lee)    Follow Up: 1 Month    Follow Up Details: call for appointment, bring all medication bottles to appointment         Discharge Follow-up with Specified Provider: Dr. Berger's BJ, Jan 6th at 10:00 am, [place] office   As directed      To: Dr. Bains BJ, Jan 6th at 10:00 am, [place] office               Test Results Pending at Discharge         BJ Oakley  12/21/21  15:15 EST      Electronically signed by Samuel Berger MD at 12/22/21 0904

## 2021-12-23 NOTE — PAYOR COMM NOTE
"This is request for re-review for inpt days 12/15 to 12/21 for Saundra Holder   Auth# [id number]    EXTENDED AUTHORIZATION PENDING:   Please call or fax determination to contact below.   Thank you.    Marcia Smith, RN, BSN  Utilization Review Nurse  [facility]  Direct & confidential phone # [phone]  Fax # [phone]      Saundra Holder (51 y.o. Female)             Date of Birth Social Security Number Address Home Phone MRN    1970  [address] [facility] [address] [facility]  [place] [address] [phone] [id number]    Mosque Marital Status             None        Admission Date Admission Type Admitting Provider Attending Provider Department, Room/Bed    12/13/21 Urgent Samuel Berger MD  [facility] CARDIOVASCULAR CARE UNIT, 2205/1    Discharge Date Discharge Disposition Discharge Destination          12/21/2021 Rehab Facility or Unit (DC - External)              Attending Provider: (none)   Allergies: Nitrofuran Derivatives    Isolation: None   Infection: None   Code Status: Prior   Advance Care Planning Activity    Ht: 165.1 cm (65\")   Wt: 87.8 kg (193 lb 9 oz)    Admission Cmt: None   Principal Problem: None                Active Insurance as of 12/13/2021     Primary Coverage     Payor Plan Insurance Group Employer/Plan Group    [facility] [employer] [facility] [employer] [facility] PPO [id number]     Payor Plan Address Payor Plan Phone Number Payor Plan Fax Number Effective Dates    [address] [phone]  1/1/2021 - None Entered    [place] [address]       Subscriber Name Subscriber Birth Date Member ID       SAUNDRA HOLDER 1970 [id number]                 Emergency Contacts      (Rel.) Home Phone Work Phone Mobile Phone    ELISA HOLDER (Spouse) [phone] -- [phone]    Bairon Holder (Son) [phone] -- --    Carlyle Bayry (Brother) -- -- [phone]            Operative/Procedure Notes (last 7 days)  Notes from 12/16/21 0951 through " 12/23/21 0951   No notes of this type exist for this encounter.

## 2022-01-13 ENCOUNTER — OFFICE VISIT (OUTPATIENT)
Dept: CARDIAC SURGERY | Facility: CLINIC | Age: 52
End: 2022-01-13

## 2022-01-13 VITALS
SYSTOLIC BLOOD PRESSURE: 133 MMHG | HEART RATE: 90 BPM | RESPIRATION RATE: 20 BRPM | HEIGHT: 65 IN | OXYGEN SATURATION: 95 % | DIASTOLIC BLOOD PRESSURE: 72 MMHG | TEMPERATURE: 97.3 F | BODY MASS INDEX: 29.57 KG/M2 | WEIGHT: 177.5 LBS

## 2022-01-13 DIAGNOSIS — Z95.1 S/P CABG X 4: ICD-10-CM

## 2022-01-13 PROCEDURE — 99024 POSTOP FOLLOW-UP VISIT: CPT | Performed by: NURSE PRACTITIONER

## 2022-01-13 RX ORDER — HYDROCODONE BITARTRATE AND ACETAMINOPHEN 5; 325 MG/1; MG/1
1 TABLET ORAL EVERY 6 HOURS PRN
Qty: 40 TABLET | Refills: 0 | Status: SHIPPED | OUTPATIENT
Start: 2022-01-13

## 2022-01-13 RX ORDER — ONDANSETRON 4 MG/1
4 TABLET, FILM COATED ORAL EVERY 8 HOURS PRN
Qty: 30 TABLET | Refills: 0 | Status: SHIPPED | OUTPATIENT
Start: 2022-01-13

## 2022-01-13 RX ORDER — FERROUS SULFATE TAB EC 324 MG (65 MG FE EQUIVALENT) 324 (65 FE) MG
324 TABLET DELAYED RESPONSE ORAL
Qty: 30 TABLET | Refills: 2 | Status: SHIPPED | OUTPATIENT
Start: 2022-01-13

## 2022-01-13 RX ORDER — METOPROLOL TARTRATE 50 MG/1
50 TABLET, FILM COATED ORAL 2 TIMES DAILY
Qty: 60 TABLET | Refills: 2 | Status: SHIPPED | OUTPATIENT
Start: 2022-01-13

## 2022-01-13 RX ORDER — CLOPIDOGREL BISULFATE 75 MG/1
75 TABLET ORAL DAILY
Qty: 30 TABLET | Refills: 2 | Status: SHIPPED | OUTPATIENT
Start: 2022-01-13

## 2022-01-13 RX ORDER — ATORVASTATIN CALCIUM 80 MG/1
80 TABLET, FILM COATED ORAL NIGHTLY
Qty: 30 TABLET | Refills: 1 | Status: SHIPPED | OUTPATIENT
Start: 2022-01-13

## 2022-01-13 NOTE — PROGRESS NOTES
"CARDIOVASCULAR SURGERY FOLLOW-UP PROGRESS NOTE  Chief Complaint: Postop follow-up        HPI:   Dear Dr. Dickinson, BJ Caldwell and colleagues:    It was nice to see Saundra Holder in follow up 4 weeks after surgery.  As you know, she is a 51 y.o. female with CAD, presentation with non-STEMI, hypertension, hyperlipidemia, diabetes mellitus type 2, asthma, postop expected acute blood loss anemia who underwent CABG x4 with LIMA on 12/14/2021 with Dr. Berger at [facility]. She did well postoperatively and continues to do well.  Her  has started treatment for cancer, she initially went to rehab services for convalescence prior to returning home, she states that she has been home for approximately 1 week and is doing well.  She comes in today complaining of occasional nausea after pain medications, have sent in a prescription for Zofran 4 mg as needed and a refill for her pain medication.  Instructed her to discontinue her Lasix.  Her activity level has been good.  From a surgical standpoint, the sternal incision is well approximated without erythema, edema, or drainage.  I did remove the scab from the distal portion of her incision, there is no drainage or signs of infection present.  The sternum is stable to palpation, and the patient denies any popping or clicking with deep inspiration or coughing.      Physical Exam:         /72 (BP Location: Right arm, Patient Position: Sitting, Cuff Size: Adult)   Pulse 90   Temp 97.3 °F (36.3 °C) (Oral)   Resp 20   Ht 165.1 cm (65\")   Wt 80.5 kg (177 lb 8 oz)   LMP  (LMP Unknown)   SpO2 95%   BMI 29.54 kg/m²   Heart:  regular rate and rhythm, S1, S2 normal, no murmur, click, rub or gallop  Lungs:  clear to auscultation bilaterally  Extremities:  no edema  Incision(s):  mid chest healing well, right leg healing well, sternum stable    Assessment/Plan:     S/P CABG. Overall, she is doing well.    No significant post-op complications    No heavy lifting " > 10 pounds for 2 more weeks  Keep incisions clean and dry  Follow-up as scheduled with cardiology  Follow-up with CT surgery prn    Continue lifting restriction of 10 lbs until 6 weeks and 50 lbs until 12 weeks from the date of surgery, no excessive jarring motions or twisting motions until 12 weeks from the date of surgery    Return to clinic if any signs or symptoms of infection or sternal instability develop       Thank you for allowing me to participate in the care of your patient.    Regards,  BJ Oakley    Current outpatient and discharge medications have been reconciled for the patient.  Reviewed by: BJ Oakley

## 2022-01-13 NOTE — PATIENT INSTRUCTIONS
Continue lifting restriction of 10 lbs until 6 weeks and 50 lbs until 12 weeks from the date of surgery, no excessive jarring motions or twisting motions until 12 weeks from the date of surgery

## 2022-02-09 RX ORDER — FERROUS SULFATE TAB EC 324 MG (65 MG FE EQUIVALENT) 324 (65 FE) MG
TABLET DELAYED RESPONSE ORAL
Qty: 30 TABLET | Refills: 2 | OUTPATIENT
Start: 2022-02-09

## 2022-02-09 RX ORDER — CLOPIDOGREL BISULFATE 75 MG/1
TABLET ORAL
Qty: 30 TABLET | Refills: 2 | OUTPATIENT
Start: 2022-02-09

## 2022-03-11 RX ORDER — FERROUS SULFATE TAB EC 324 MG (65 MG FE EQUIVALENT) 324 (65 FE) MG
TABLET DELAYED RESPONSE ORAL
Qty: 30 TABLET | Refills: 2 | OUTPATIENT
Start: 2022-03-11

## 2022-03-11 RX ORDER — CLOPIDOGREL BISULFATE 75 MG/1
TABLET ORAL
Qty: 30 TABLET | Refills: 2 | OUTPATIENT
Start: 2022-03-11

## 2022-04-06 RX ORDER — CLOPIDOGREL BISULFATE 75 MG/1
TABLET ORAL
Qty: 30 TABLET | Refills: 2 | OUTPATIENT
Start: 2022-04-06

## 2022-04-06 RX ORDER — FERROUS SULFATE TAB EC 324 MG (65 MG FE EQUIVALENT) 324 (65 FE) MG
TABLET DELAYED RESPONSE ORAL
Qty: 30 TABLET | Refills: 2 | OUTPATIENT
Start: 2022-04-06

## 2023-01-09 RX ORDER — FERROUS SULFATE TAB EC 324 MG (65 MG FE EQUIVALENT) 324 (65 FE) MG
TABLET DELAYED RESPONSE ORAL
Qty: 30 TABLET | Refills: 2 | OUTPATIENT
Start: 2023-01-09

## 2023-03-09 RX ORDER — FERROUS SULFATE TAB EC 324 MG (65 MG FE EQUIVALENT) 324 (65 FE) MG
TABLET DELAYED RESPONSE ORAL
Qty: 30 TABLET | Refills: 2 | OUTPATIENT
Start: 2023-03-09

## (undated) DEVICE — SUT PDS 0 CT-1 Z340H

## (undated) DEVICE — ROTATING SURGICAL PUNCHES, 1 PER POUCH: Brand: A&E MEDICAL / ROTATING SURGICAL PUNCHES

## (undated) DEVICE — BNDG ELAS ELITE V/CLOSE 4IN 5YD LF STRL

## (undated) DEVICE — CORONARY ARTERY BYPASS GRAFT MARKERS, STAINLESS STEEL, DISTAL, WITHOUT HOLDER: Brand: ANASTOMARK CORONARY ARTERY BYPASS GRAFT MARKERS, STAINLESS STEEL, DISTAL

## (undated) DEVICE — Device

## (undated) DEVICE — PRESSURE TUBING: Brand: TRUWAVE

## (undated) DEVICE — SPNG GZ AVANT 6PLY 4X4IN STRL PK/2

## (undated) DEVICE — BNDG ELAS ELITE V/CLOSE 6IN 5YD LF STRL

## (undated) DEVICE — TBG INSUFF MALE L/L W 12MM CON: Brand: MEDLINE INDUSTRIES, INC.

## (undated) DEVICE — SUCTION CANISTER, 1500CC,SAFELINER: Brand: DEROYAL

## (undated) DEVICE — PK ATS CUST W CARDIOTOMY RESEVOIR

## (undated) DEVICE — SUT PROLENE PP 7/0 BV175-6 24IN

## (undated) DEVICE — TEMP PACING WIRE: Brand: MYO/WIRE

## (undated) DEVICE — SOL NACL 0.9PCT 1000ML

## (undated) DEVICE — CANN AORT ROOT DLP VNT/8IN 14G 7F

## (undated) DEVICE — SUT SILK 4/0 TIES 18IN A183H

## (undated) DEVICE — BG BLD SYS

## (undated) DEVICE — SUT SILK 2/0 SH CR8 18IN CR8 C012D

## (undated) DEVICE — BLD SCLPL BEAVR MINI STR 2BVL 180D LF

## (undated) DEVICE — BLOWER MISTER CLEARVIEW W/TBG

## (undated) DEVICE — INTENDED FOR TISSUE SEPARATION, AND OTHER PROCEDURES THAT REQUIRE A SHARP SURGICAL BLADE TO PUNCTURE OR CUT.: Brand: BARD-PARKER ® CARBON RIB-BACK BLADES

## (undated) DEVICE — SUT SILK 0 CT1 CR8 18IN C021D

## (undated) DEVICE — SOL IRR H2O BTL 1000ML STRL

## (undated) DEVICE — SUT PROLN 4/0 V7 36IN 8975H

## (undated) DEVICE — ANTIBACTERIAL UNDYED BRAIDED (POLYGLACTIN 910), SYNTHETIC ABSORBABLE SUTURE: Brand: COATED VICRYL

## (undated) DEVICE — SUT SILK 2/0 TIES 18IN A185H

## (undated) DEVICE — TUBING, SUCTION, 1/4" X 12', STRAIGHT: Brand: MEDLINE

## (undated) DEVICE — DRAPE SHEET ULTRAGARD: Brand: MEDLINE

## (undated) DEVICE — BLOOD TRANSFUSION FILTER: Brand: HAEMONETICS

## (undated) DEVICE — SYS PERFUS SEP PLATLT W TIPS CUST

## (undated) DEVICE — SYS VASOVIEW HEMOPRO ENDOSCOPIC HARVST VESL

## (undated) DEVICE — SUT PROLN 5/0 V5 36IN 8934H

## (undated) DEVICE — SUT PROLN 3/0 V7 D/A 36IN 8976H

## (undated) DEVICE — ELECTRD DEFIB M/FUNC PROPADZ STRL 2PK

## (undated) DEVICE — SUT PROLN 7/0 BV1 D/A 30IN 8703H

## (undated) DEVICE — HEMOCONCENTRATOR PERFUS LPS06

## (undated) DEVICE — PK PERFUS CUST W/CARDIOPLEGIA

## (undated) DEVICE — 28 FR STRAIGHT – SOFT PVC CATHETER: Brand: PVC THORACIC CATHETERS

## (undated) DEVICE — GLV SURG BIOGEL LTX PF 7 1/2

## (undated) DEVICE — SENSR CERBRL O2 PK/2

## (undated) DEVICE — SUT PROLN 4/0 V5 36IN 8935H

## (undated) DEVICE — CONNECT Y INTERSEPT W/LL 3/8 X 3/8 X 3/8IN

## (undated) DEVICE — ELECTRD BLD EZ CLN STD 6.5IN

## (undated) DEVICE — PK OPN HEART WHT WRP 50

## (undated) DEVICE — CABL BIPOL W/ALLGTR CLIP/SM 12FT

## (undated) DEVICE — SUT PROLN 8/0 BV2D CARDIO 18IN 8730H

## (undated) DEVICE — SUT SILK 2 SUTUPAK TIE 60IN SA8H 2STRAND

## (undated) DEVICE — SUT PROLN 6/0 RB2 D/A 30IN 8711H

## (undated) DEVICE — CANN ART EOPA 3D NV W/CONN 20F

## (undated) DEVICE — SOL IRR NACL 0.9PCT BT 1000ML

## (undated) DEVICE — TOWEL,OR,DSP,ST,WHITE,DLX,4/PK,20PK/CS: Brand: MEDLINE